# Patient Record
Sex: MALE | Race: WHITE | NOT HISPANIC OR LATINO | Employment: OTHER | URBAN - METROPOLITAN AREA
[De-identification: names, ages, dates, MRNs, and addresses within clinical notes are randomized per-mention and may not be internally consistent; named-entity substitution may affect disease eponyms.]

---

## 2023-08-24 ENCOUNTER — HOSPITAL ENCOUNTER (OUTPATIENT)
Dept: DATA CONVERSION | Facility: HOSPITAL | Age: 83
Discharge: HOME | End: 2023-08-24
Payer: MEDICARE

## 2023-08-24 DIAGNOSIS — R73.01 IMPAIRED FASTING GLUCOSE: ICD-10-CM

## 2023-08-24 LAB
ALBUMIN SERPL-MCNC: 4.4 GM/DL (ref 3.5–5)
ALBUMIN/GLOB SERPL: 2 RATIO (ref 1.5–3)
ALP BLD-CCNC: 65 U/L (ref 35–125)
ALT SERPL-CCNC: 17 U/L (ref 5–40)
ANION GAP SERPL CALCULATED.3IONS-SCNC: 11 MMOL/L (ref 0–19)
AST SERPL-CCNC: 29 U/L (ref 5–40)
BILIRUB SERPL-MCNC: 1.2 MG/DL (ref 0.1–1.2)
BUN SERPL-MCNC: 15 MG/DL (ref 8–25)
BUN/CREAT SERPL: 12.5 RATIO (ref 8–21)
CALCIUM SERPL-MCNC: 9.8 MG/DL (ref 8.5–10.4)
CHLORIDE SERPL-SCNC: 106 MMOL/L (ref 97–107)
CO2 SERPL-SCNC: 25 MMOL/L (ref 24–31)
CREAT SERPL-MCNC: 1.2 MG/DL (ref 0.4–1.6)
DEPRECATED RDW RBC AUTO: 43.6 FL (ref 37–54)
ERYTHROCYTE [DISTWIDTH] IN BLOOD BY AUTOMATED COUNT: 12.7 % (ref 11.7–15)
GFR SERPL CREATININE-BSD FRML MDRD: 61 ML/MIN/1.73 M2
GLOBULIN SER-MCNC: 2.2 G/DL (ref 1.9–3.7)
GLUCOSE SERPL-MCNC: 106 MG/DL (ref 65–99)
HBA1C MFR BLD: 5.9 % (ref 4–6)
HCT VFR BLD AUTO: 46.8 % (ref 41–50)
HGB BLD-MCNC: 16.1 GM/DL (ref 13.5–16.5)
MCH RBC QN AUTO: 32.6 PG (ref 26–34)
MCHC RBC AUTO-ENTMCNC: 34.4 % (ref 31–37)
MCV RBC AUTO: 94.7 FL (ref 80–100)
NRBC BLD-RTO: 0 /100 WBC
PLATELET # BLD AUTO: 208 K/UL (ref 150–450)
PMV BLD AUTO: 10.8 CU (ref 7–12.6)
POTASSIUM SERPL-SCNC: 4.1 MMOL/L (ref 3.4–5.1)
PROT SERPL-MCNC: 6.6 G/DL (ref 5.9–7.9)
RBC # BLD AUTO: 4.94 M/UL (ref 4.5–5.5)
SODIUM SERPL-SCNC: 142 MMOL/L (ref 133–145)
WBC # BLD AUTO: 3.9 K/UL (ref 4.5–11)

## 2023-09-06 PROBLEM — E55.9 VITAMIN D DEFICIENCY: Status: ACTIVE | Noted: 2023-09-06

## 2023-09-06 PROBLEM — K21.9 GASTROESOPHAGEAL REFLUX DISEASE: Status: ACTIVE | Noted: 2023-09-06

## 2023-09-06 PROBLEM — N43.40 SPERMATOCELE: Status: ACTIVE | Noted: 2023-09-06

## 2023-09-06 PROBLEM — N52.1 ERECTILE DYSFUNCTION DUE TO DISEASES CLASSIFIED ELSEWHERE: Status: ACTIVE | Noted: 2023-09-06

## 2023-09-06 PROBLEM — R42 VERTIGO: Status: ACTIVE | Noted: 2023-09-06

## 2023-09-06 PROBLEM — K86.2 PANCREATIC CYST (HHS-HCC): Status: ACTIVE | Noted: 2023-09-06

## 2023-09-06 PROBLEM — I25.2 HISTORY OF MYOCARDIAL INFARCTION: Status: ACTIVE | Noted: 2023-09-06

## 2023-09-06 PROBLEM — I10 ESSENTIAL HYPERTENSION: Status: ACTIVE | Noted: 2023-09-06

## 2023-09-06 PROBLEM — I49.3 PVCS (PREMATURE VENTRICULAR CONTRACTIONS): Status: ACTIVE | Noted: 2023-09-06

## 2023-09-06 PROBLEM — Z95.5 HISTORY OF CORONARY ARTERY STENT PLACEMENT: Status: ACTIVE | Noted: 2023-09-06

## 2023-09-06 PROBLEM — N40.0 BENIGN PROSTATIC HYPERPLASIA: Status: ACTIVE | Noted: 2023-09-06

## 2023-09-06 PROBLEM — M31.30 GRANULOMATOSIS WITH POLYANGIITIS (MULTI): Status: ACTIVE | Noted: 2023-09-06

## 2023-09-06 PROBLEM — M19.90 DEGENERATIVE JOINT DISEASE: Status: ACTIVE | Noted: 2023-09-06

## 2023-09-06 PROBLEM — H91.90 HARD OF HEARING: Status: ACTIVE | Noted: 2023-09-06

## 2023-09-06 PROBLEM — I25.10 ATHEROSCLEROSIS OF NATIVE CORONARY ARTERY OF NATIVE HEART WITHOUT ANGINA PECTORIS: Status: ACTIVE | Noted: 2023-09-06

## 2023-09-06 PROBLEM — E78.00 PURE HYPERCHOLESTEROLEMIA: Status: ACTIVE | Noted: 2023-09-06

## 2023-09-06 PROBLEM — I49.1 PAC (PREMATURE ATRIAL CONTRACTION): Status: ACTIVE | Noted: 2023-09-06

## 2023-09-06 PROBLEM — R73.01 IMPAIRED FASTING GLUCOSE: Status: ACTIVE | Noted: 2023-09-06

## 2023-09-06 PROBLEM — D64.9 ANEMIA: Status: ACTIVE | Noted: 2023-09-06

## 2023-09-06 RX ORDER — EPINEPHRINE 0.3 MG/.3ML
INJECTION INTRAMUSCULAR
COMMUNITY
Start: 2018-12-13 | End: 2023-11-07 | Stop reason: ALTCHOICE

## 2023-09-06 RX ORDER — PREDNISONE 5 MG/1
TABLET ORAL
COMMUNITY
Start: 2022-09-23 | End: 2024-01-16 | Stop reason: ALTCHOICE

## 2023-09-06 RX ORDER — CEFUROXIME AXETIL 250 MG/1
1 TABLET ORAL 2 TIMES DAILY
COMMUNITY
Start: 2018-02-28 | End: 2023-11-07 | Stop reason: ALTCHOICE

## 2023-09-06 RX ORDER — EZETIMIBE 10 MG
TABLET ORAL
COMMUNITY
Start: 2023-02-06 | End: 2023-12-20

## 2023-09-06 RX ORDER — GUAIFENESIN 1200 MG
TABLET, EXTENDED RELEASE 12 HR ORAL
COMMUNITY
End: 2023-11-07 | Stop reason: ALTCHOICE

## 2023-09-06 RX ORDER — ACETAMINOPHEN 500 MG
TABLET ORAL
COMMUNITY

## 2023-09-06 RX ORDER — CHOLECALCIFEROL (VITAMIN D3) 125 MCG
CAPSULE ORAL
COMMUNITY
End: 2023-11-07 | Stop reason: ALTCHOICE

## 2023-09-06 RX ORDER — FUROSEMIDE 20 MG/1
TABLET ORAL
COMMUNITY
End: 2024-01-17 | Stop reason: ALTCHOICE

## 2023-09-06 RX ORDER — POTASSIUM CHLORIDE 20 MEQ/1
TABLET, EXTENDED RELEASE ORAL
COMMUNITY
Start: 2023-06-12 | End: 2023-12-05

## 2023-09-06 RX ORDER — LANOLIN ALCOHOL/MO/W.PET/CERES
1 CREAM (GRAM) TOPICAL DAILY
COMMUNITY
End: 2023-11-07 | Stop reason: ALTCHOICE

## 2023-09-06 RX ORDER — PREDNISONE 2.5 MG/1
TABLET ORAL
COMMUNITY
Start: 2022-11-23 | End: 2023-11-07 | Stop reason: ALTCHOICE

## 2023-09-06 RX ORDER — FOLIC ACID 0.8 MG
TABLET ORAL
COMMUNITY

## 2023-09-06 RX ORDER — POLYETHYLENE GLYCOL 1450
POWDER (GRAM) MISCELLANEOUS
COMMUNITY
End: 2023-11-07 | Stop reason: ALTCHOICE

## 2023-09-06 RX ORDER — SULFAMETHOXAZOLE AND TRIMETHOPRIM 800; 160 MG/1; MG/1
TABLET ORAL
COMMUNITY
Start: 2023-07-24 | End: 2023-10-22

## 2023-09-06 RX ORDER — AMLODIPINE BESYLATE 10 MG/1
TABLET ORAL
COMMUNITY
Start: 2022-09-08 | End: 2023-11-07 | Stop reason: ALTCHOICE

## 2023-09-06 RX ORDER — PYRIDOXINE HCL (VITAMIN B6) 100 MG
1 TABLET ORAL DAILY
COMMUNITY
End: 2023-11-07 | Stop reason: ALTCHOICE

## 2023-09-06 RX ORDER — AVACOPAN 10 MG/1
CAPSULE ORAL
COMMUNITY
Start: 2023-04-28 | End: 2024-01-16 | Stop reason: ALTCHOICE

## 2023-09-06 RX ORDER — ALBUTEROL SULFATE 90 UG/1
AEROSOL, METERED RESPIRATORY (INHALATION)
COMMUNITY
Start: 2018-02-28 | End: 2023-11-07 | Stop reason: ALTCHOICE

## 2023-09-06 RX ORDER — VITAMIN E MIXED 400 UNIT
CAPSULE ORAL
COMMUNITY

## 2023-09-06 RX ORDER — OMEPRAZOLE 20 MG/1
TABLET, DELAYED RELEASE ORAL
COMMUNITY

## 2023-09-06 RX ORDER — ROSUVASTATIN CALCIUM 10 MG/1
TABLET, COATED ORAL
COMMUNITY
Start: 2023-02-06 | End: 2023-12-20

## 2023-09-06 RX ORDER — PETROLATUM,WHITE/LANOLIN
OINTMENT (GRAM) TOPICAL
COMMUNITY

## 2023-09-06 RX ORDER — ASPIRIN 81 MG/1
TABLET ORAL
COMMUNITY

## 2023-09-06 RX ORDER — GLUCOSAMINE/CHONDR SU A SOD 750-600 MG
1 TABLET ORAL DAILY
COMMUNITY
End: 2023-11-07 | Stop reason: ALTCHOICE

## 2023-09-06 RX ORDER — HYDROCHLOROTHIAZIDE 12.5 MG/1
12.5 CAPSULE ORAL
COMMUNITY
Start: 2023-06-12 | End: 2023-11-07 | Stop reason: ALTCHOICE

## 2023-09-06 RX ORDER — BENZONATATE 200 MG/1
CAPSULE ORAL
COMMUNITY
Start: 2018-02-28 | End: 2023-11-07 | Stop reason: ALTCHOICE

## 2023-10-30 ENCOUNTER — TELEPHONE (OUTPATIENT)
Dept: PRIMARY CARE | Facility: CLINIC | Age: 83
End: 2023-10-30
Payer: MEDICARE

## 2023-10-30 NOTE — TELEPHONE ENCOUNTER
Spouse called to update that patient tested positive for Covid after going to the ER on Saturday 10/28. Started on Paxlovid, doing ok. Appointment for 10/31 rescheduled to 11/7.

## 2023-10-31 ENCOUNTER — APPOINTMENT (OUTPATIENT)
Dept: PRIMARY CARE | Facility: CLINIC | Age: 83
End: 2023-10-31
Payer: MEDICARE

## 2023-11-07 ENCOUNTER — TELEPHONE (OUTPATIENT)
Dept: PRIMARY CARE | Facility: CLINIC | Age: 83
End: 2023-11-07

## 2023-11-07 ENCOUNTER — OFFICE VISIT (OUTPATIENT)
Dept: PRIMARY CARE | Facility: CLINIC | Age: 83
End: 2023-11-07
Payer: MEDICARE

## 2023-11-07 VITALS
TEMPERATURE: 97.9 F | HEART RATE: 53 BPM | SYSTOLIC BLOOD PRESSURE: 130 MMHG | BODY MASS INDEX: 30.61 KG/M2 | DIASTOLIC BLOOD PRESSURE: 70 MMHG | WEIGHT: 195 LBS | OXYGEN SATURATION: 95 % | HEIGHT: 67 IN

## 2023-11-07 DIAGNOSIS — N62 GYNECOMASTIA: Primary | ICD-10-CM

## 2023-11-07 DIAGNOSIS — K21.9 GASTROESOPHAGEAL REFLUX DISEASE WITHOUT ESOPHAGITIS: ICD-10-CM

## 2023-11-07 DIAGNOSIS — M31.30 GRANULOMATOSIS WITH POLYANGIITIS WITHOUT RENAL INVOLVEMENT (MULTI): ICD-10-CM

## 2023-11-07 DIAGNOSIS — E55.9 VITAMIN D DEFICIENCY: ICD-10-CM

## 2023-11-07 DIAGNOSIS — I25.10 ATHEROSCLEROSIS OF NATIVE CORONARY ARTERY OF NATIVE HEART WITHOUT ANGINA PECTORIS: ICD-10-CM

## 2023-11-07 DIAGNOSIS — I49.3 PVCS (PREMATURE VENTRICULAR CONTRACTIONS): ICD-10-CM

## 2023-11-07 DIAGNOSIS — R73.01 IMPAIRED FASTING GLUCOSE: ICD-10-CM

## 2023-11-07 DIAGNOSIS — R92.8 ABNORMAL MAMMOGRAM OF RIGHT BREAST: ICD-10-CM

## 2023-11-07 PROBLEM — Z87.19 HISTORY OF PANCREATITIS: Status: ACTIVE | Noted: 2023-11-07

## 2023-11-07 PROBLEM — Z87.19 HISTORY OF PANCREATITIS: Status: RESOLVED | Noted: 2023-11-07 | Resolved: 2023-11-07

## 2023-11-07 PROBLEM — E66.3 OVERWEIGHT WITH BODY MASS INDEX (BMI) 25.0-29.9: Status: ACTIVE | Noted: 2023-11-07

## 2023-11-07 PROBLEM — D64.9 ANEMIA: Status: RESOLVED | Noted: 2023-09-06 | Resolved: 2023-11-07

## 2023-11-07 PROCEDURE — 99213 OFFICE O/P EST LOW 20 MIN: CPT | Performed by: INTERNAL MEDICINE

## 2023-11-07 PROCEDURE — 1125F AMNT PAIN NOTED PAIN PRSNT: CPT | Performed by: INTERNAL MEDICINE

## 2023-11-07 PROCEDURE — 3075F SYST BP GE 130 - 139MM HG: CPT | Performed by: INTERNAL MEDICINE

## 2023-11-07 PROCEDURE — 1036F TOBACCO NON-USER: CPT | Performed by: INTERNAL MEDICINE

## 2023-11-07 PROCEDURE — 3078F DIAST BP <80 MM HG: CPT | Performed by: INTERNAL MEDICINE

## 2023-11-07 PROCEDURE — 1159F MED LIST DOCD IN RCRD: CPT | Performed by: INTERNAL MEDICINE

## 2023-11-07 RX ORDER — SULFAMETHOXAZOLE AND TRIMETHOPRIM 200; 40 MG/5ML; MG/5ML
SUSPENSION ORAL DAILY
COMMUNITY
End: 2024-04-16 | Stop reason: WASHOUT

## 2023-11-07 ASSESSMENT — ENCOUNTER SYMPTOMS
PALPITATIONS: 0
DEPRESSION: 0
LOSS OF SENSATION IN FEET: 0
OCCASIONAL FEELINGS OF UNSTEADINESS: 0
SHORTNESS OF BREATH: 0

## 2023-11-07 ASSESSMENT — PATIENT HEALTH QUESTIONNAIRE - PHQ9
2. FEELING DOWN, DEPRESSED OR HOPELESS: NOT AT ALL
SUM OF ALL RESPONSES TO PHQ9 QUESTIONS 1 AND 2: 0
1. LITTLE INTEREST OR PLEASURE IN DOING THINGS: NOT AT ALL

## 2023-11-07 ASSESSMENT — PAIN SCALES - GENERAL: PAINLEVEL: 4

## 2023-11-07 NOTE — TELEPHONE ENCOUNTER
Patient called stating that he could not schedule his mammogram. States he was told by scheduling that due to this being the first time he is having this done the order has to include both sides, not just the left side. Order needs to be updated before the patient can schedule.

## 2023-11-07 NOTE — TELEPHONE ENCOUNTER
Patient made aware that new order was placed and to call and schedule, stated thanks and understanding

## 2023-11-07 NOTE — PATIENT INSTRUCTIONS
Diagnoses and all orders for this visit:  Gynecomastia  Comments:  Mild inflammation of left breast, Mild gynecoamstia. Will check mammogram. [phone] to schedule. Doubt medication causing it. Should resolve over time.  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

## 2023-11-07 NOTE — PROGRESS NOTES
[facility]: [facility]  PROGRESS NOTE      Boyd Johns is a 83 y.o. male that is presenting today for left breast pain and tenderness.    Assessment/Plan   Diagnoses and all orders for this visit:  Gynecomastia  Comments:  Mild inflammation of left breast, Mild gynecoamstia. Will check mammogram. [phone] to schedule. Doubt medication causinng it. Should resolve over time.  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 2 moths left breaast tenderness. Meds reviwed.      Review of Systems   Respiratory:  Negative for shortness of breath.    Cardiovascular:  Negative for chest pain and palpitations.   All other systems reviewed and are negative.     Objective   Vitals:    11/07/23 1010   BP: 130/70   Pulse: 53   Temp: 36.6 °C (97.9 °F)   SpO2: 95%      Body mass index is 30.54 kg/m².  Physical Exam  Constitutional:       General: He is not in acute distress.  HENT:      Head: Normocephalic and atraumatic.      Right Ear: Tympanic membrane normal.      Left Ear: Tympanic membrane normal.      Mouth/Throat:      Mouth: Mucous membranes are moist.      Pharynx: Oropharynx is clear.   Eyes:      Extraocular Movements: Extraocular movements intact.      Conjunctiva/sclera: Conjunctivae normal.      Pupils: Pupils are equal, round, and reactive to light.   Cardiovascular:      Rate and Rhythm: Normal rate and regular rhythm.   Pulmonary:      Breath sounds: Normal breath sounds.   Abdominal:      General: Bowel sounds are normal.      Palpations: Abdomen is soft. There is no mass.   Musculoskeletal:         General: Normal  "range of motion.      Cervical back: Neck supple. No tenderness.   Skin:     General: Skin is warm and dry.      Comments: Left breast mild gynecomastia no discharge of nipple, no axillary nodes.   Neurological:      General: No focal deficit present.      Mental Status: He is oriented to person, place, and time.       Diagnostic Results   Lab Results   Component Value Date    GLUCOSE 106 (H) 08/24/2023    CALCIUM 9.8 08/24/2023     08/24/2023    K 4.1 08/24/2023    CO2 25 08/24/2023     08/24/2023    BUN 15 08/24/2023    CREATININE 1.2 08/24/2023     Lab Results   Component Value Date    ALT 17 08/24/2023    AST 29 08/24/2023    ALKPHOS 65 08/24/2023    BILITOT 1.2 08/24/2023     Lab Results   Component Value Date    WBC 3.9 (L) 08/24/2023    HGB 16.1 08/24/2023    HCT 46.8 08/24/2023    MCV 94.7 08/24/2023     08/24/2023     Lab Results   Component Value Date    CHOL 139 04/26/2023    CHOL 141 01/19/2023    CHOL 173 06/27/2022     Lab Results   Component Value Date    HDL 59 04/26/2023    HDL 49 01/19/2023    HDL 62 06/27/2022     Lab Results   Component Value Date    LDLCALC 62 (L) 04/26/2023    LDLCALC 74 01/19/2023    LDLCALC 94 06/27/2022     Lab Results   Component Value Date    TRIG 88 04/26/2023    TRIG 89 01/19/2023    TRIG 86 06/27/2022     No components found for: \"CHOLHDL\"  Lab Results   Component Value Date    HGBA1C 5.9 08/24/2023     Other labs not included in the list above were reviewed either before or during this encounter.    History    History reviewed. No pertinent past medical history.  History reviewed. No pertinent surgical history.  Family History   Problem Relation Name Age of Onset    Pancreatic cancer Mother      Alzheimer's disease Father      Mental illness Father      Cancer Daughter      Heart disease Sibling       Social History     Socioeconomic History    Marital status:      Spouse name: Not on file    Number of children: Not on file    Years of " education: Not on file    Highest education level: Not on file   Occupational History    Not on file   Tobacco Use    Smoking status: Never     Passive exposure: Never    Smokeless tobacco: Never   Vaping Use    Vaping Use: Never used   Substance and Sexual Activity    Alcohol use: Yes    Drug use: Never    Sexual activity: Not on file   Other Topics Concern    Not on file   Social History Narrative    Not on file     Social Determinants of Health     Financial Resource Strain: Not on file   Food Insecurity: Not on file   Transportation Needs: Not on file   Physical Activity: Not on file   Stress: Not on file   Social Connections: Not on file   Intimate Partner Violence: Not on file   Housing Stability: Not on file     No Known Allergies  Current Outpatient Medications on File Prior to Visit   Medication Sig Dispense Refill    ascorbic acid, vitamin C, 1,000 mg ER tablet Take 1 tablet (1,000 mg) by mouth once daily.      aspirin 81 mg EC tablet 1 tablet Orally Once a day      cholecalciferol (Vitamin D-3) 1,250 mcg (50,000 unit) capsule 1 capsule Orally Once a day      folic acid (Folvite) 800 mcg tablet 1/2 tab Orally Once a day      furosemide (Lasix) 20 mg tablet Take 1 tablet by mouth every Monday,Wednesday,Friday.      glucosamine sulfate 500 mg capsule 1 caps Orally Once a day      omeprazole OTC (PriLOSEC OTC) 20 mg EC tablet 1/2 tablet by mouth daily      potassium chloride CR 20 mEq ER tablet Take 1 tablet by mouth every Monday,Wednesday,Friday.      predniSONE (Deltasone) 5 mg tablet Take 2.5 tablets by mouth once daily for 7 days, 2 tablets once daily for 7 days, 1.5 tablets once daily for 7 days, THEN 1 tablet once daily.      rosuvastatin (Crestor) 10 mg tablet 1 tablet Orally Once a day      sulfamethoxazole-trimethoprim (Bactrim) 200-40 mg/5 mL suspension Take by mouth once daily. M-W-F      Tavneos 10 mg capsule 6 days a week      VITAMIN B COMPLEX ORAL 1000mcg Orally Daily      vitamin E 180 mg (400  unit) capsule 1 capsule Orally Once a day      Zetia 10 mg tablet 1 tablet Orally Once a day      ZINC ORAL Zinc 500 mg - 1 tablet Orally once a day      [DISCONTINUED] acetaminophen (Tylenol) 325 mg capsule 1 capsule as needed Orally every 6 hrs      [DISCONTINUED] albuterol (ProAir HFA) 90 mcg/actuation inhaler INHALE 2 PUFFS EVERY 4-6 HOURS AS NEEDED.      [DISCONTINUED] amLODIPine (Norvasc) 10 mg tablet TAKE 1 TABLET BY MOUTH EVERY DAY      [DISCONTINUED] atorvastatin calcium (ATORVASTATIN ORAL) Take 30 mg by mouth every other day.      [DISCONTINUED] benzonatate (Tessalon) 200 mg capsule TAKE 1 CAPSULE 3 TIMES DAILY AS NEEDED.      [DISCONTINUED] cefuroxime (Ceftin) 250 mg tablet Take 1 tablet (250 mg) by mouth 2 times a day.      [DISCONTINUED] cholecalciferol (Vitamin D-3) 125 MCG (5000 UT) capsule 1 cap(s) orally once a day      [DISCONTINUED] clotrimazole 1 % lotion 1 application Externally Twice a day      [DISCONTINUED] cyanocobalamin (Vitamin B-12) 1,000 mcg tablet Take 1 tablet (1,000 mcg) by mouth once daily.      [DISCONTINUED] EPINEPHrine (EpiPen 2-Dez) 0.3 mg/0.3 mL injection syringe as directed Injection Daily prn swollen lipis/tongue difficulty breathing      [DISCONTINUED] glucosamine HCl 1,500 mg tablet Take 1 tablet by mouth once daily.      [DISCONTINUED] hydroCHLOROthiazide (Microzide) 12.5 mg capsule Take 1 capsule (12.5 mg) by mouth once daily.      [DISCONTINUED] MAGNESIUM ORAL 400 milligram(s) orally once a day      [DISCONTINUED] polyethylene glycol 1450,bulk, powder as directed      [DISCONTINUED] predniSONE (Deltasone) 2.5 mg tablet Take 1 tablet by mouth once daily for 28 days.      [DISCONTINUED] pyridoxine (Vitamin B-6) 100 mg tablet Take 1 tablet (100 mg) by mouth once daily.      [DISCONTINUED] riTUXimab (Rituxan) 10 mg/mL injection as directed Intravenous as directed       No current facility-administered medications on file prior to visit.     Immunization History   Administered  Date(s) Administered    DTaP vaccine, pediatric  (INFANRIX) 04/18/2012    Flu vaccine, quadrivalent, high-dose, preservative free, age 65y+ (FLUZONE) 11/19/2021, 10/10/2022    Influenza, High Dose Seasonal, Preservative Free 12/13/2016, 12/13/2018, 12/04/2019    Influenza, Seasonal, Quadrivalent, Adjuvanted 10/02/2020    Influenza, seasonal, injectable 11/03/2010, 10/11/2012, 12/16/2015    Moderna SARS-CoV-2 Vaccination 02/05/2021, 03/05/2021, 11/19/2021    Pfizer COVID-19 vaccine, bivalent, age 12 years and older (30 mcg/0.3 mL) 11/26/2022    Pfizer Gray Cap SARS-CoV-2 06/01/2022    Pneumococcal conjugate vaccine, 13-valent (PREVNAR 13) 12/16/2015    Pneumococcal conjugate vaccine, 20-valent (PREVNAR 20) 05/04/2023    Pneumococcal polysaccharide vaccine, 23-valent, age 2 years and older (PNEUMOVAX 23) 04/01/2007, 12/13/2016    Tdap vaccine, age 7 year and older (BOOSTRIX) 05/31/2022    Zoster vaccine, recombinant, adult (SHINGRIX) 05/31/2022, 10/26/2022    Zoster, live 03/31/2016     Patient's medical history was reviewed and updated either before or during this encounter.       Julio Bryant MD

## 2023-11-16 ENCOUNTER — HOSPITAL ENCOUNTER (OUTPATIENT)
Dept: RADIOLOGY | Facility: HOSPITAL | Age: 83
Discharge: HOME | End: 2023-11-16
Payer: MEDICARE

## 2023-11-16 VITALS — BODY MASS INDEX: 29.51 KG/M2 | HEIGHT: 67 IN | WEIGHT: 188 LBS

## 2023-11-16 DIAGNOSIS — N62 HYPERTROPHY OF BREAST: ICD-10-CM

## 2023-11-16 DIAGNOSIS — R92.8 OTHER ABNORMAL AND INCONCLUSIVE FINDINGS ON DIAGNOSTIC IMAGING OF BREAST: ICD-10-CM

## 2023-11-16 PROCEDURE — 77066 DX MAMMO INCL CAD BI: CPT

## 2023-12-19 DIAGNOSIS — E78.00 PURE HYPERCHOLESTEROLEMIA: Primary | ICD-10-CM

## 2023-12-20 RX ORDER — EZETIMIBE 10 MG/1
10 TABLET ORAL DAILY
Qty: 90 TABLET | Refills: 3 | Status: SHIPPED | OUTPATIENT
Start: 2023-12-20

## 2023-12-20 RX ORDER — ROSUVASTATIN CALCIUM 10 MG/1
10 TABLET, COATED ORAL DAILY
Qty: 90 TABLET | Refills: 3 | Status: SHIPPED | OUTPATIENT
Start: 2023-12-20

## 2024-01-11 ENCOUNTER — APPOINTMENT (OUTPATIENT)
Dept: CARDIOLOGY | Facility: CLINIC | Age: 84
End: 2024-01-11
Payer: MEDICARE

## 2024-01-16 ENCOUNTER — LAB (OUTPATIENT)
Dept: LAB | Facility: LAB | Age: 84
End: 2024-01-16
Payer: MEDICARE

## 2024-01-16 DIAGNOSIS — N40.3 NODULAR PROSTATE WITH LOWER URINARY TRACT SYMPTOMS: Primary | ICD-10-CM

## 2024-01-16 LAB — PSA SERPL-MCNC: 1.68 NG/ML

## 2024-01-16 PROCEDURE — 36415 COLL VENOUS BLD VENIPUNCTURE: CPT

## 2024-01-16 PROCEDURE — 84153 ASSAY OF PSA TOTAL: CPT

## 2024-01-17 ENCOUNTER — LAB (OUTPATIENT)
Dept: LAB | Facility: LAB | Age: 84
End: 2024-01-17
Payer: MEDICARE

## 2024-01-17 ENCOUNTER — OFFICE VISIT (OUTPATIENT)
Dept: PRIMARY CARE | Facility: CLINIC | Age: 84
End: 2024-01-17
Payer: MEDICARE

## 2024-01-17 VITALS
WEIGHT: 185 LBS | HEIGHT: 67 IN | BODY MASS INDEX: 29.03 KG/M2 | SYSTOLIC BLOOD PRESSURE: 136 MMHG | DIASTOLIC BLOOD PRESSURE: 76 MMHG | HEART RATE: 59 BPM | TEMPERATURE: 97.9 F | OXYGEN SATURATION: 97 %

## 2024-01-17 DIAGNOSIS — K59.01 SLOW TRANSIT CONSTIPATION: ICD-10-CM

## 2024-01-17 DIAGNOSIS — R10.12 LEFT UPPER QUADRANT PAIN: Primary | ICD-10-CM

## 2024-01-17 DIAGNOSIS — E03.9 ACQUIRED HYPOTHYROIDISM: ICD-10-CM

## 2024-01-17 LAB
ALBUMIN SERPL-MCNC: 4.6 G/DL (ref 3.5–5)
ALP BLD-CCNC: 59 U/L (ref 35–125)
ALT SERPL-CCNC: 15 U/L (ref 5–40)
ANION GAP SERPL CALC-SCNC: 9 MMOL/L
AST SERPL-CCNC: 31 U/L (ref 5–40)
BILIRUB SERPL-MCNC: 0.9 MG/DL (ref 0.1–1.2)
BUN SERPL-MCNC: 13 MG/DL (ref 8–25)
CALCIUM SERPL-MCNC: 9.9 MG/DL (ref 8.5–10.4)
CHLORIDE SERPL-SCNC: 100 MMOL/L (ref 97–107)
CO2 SERPL-SCNC: 32 MMOL/L (ref 24–31)
CREAT SERPL-MCNC: 1.2 MG/DL (ref 0.4–1.6)
EGFRCR SERPLBLD CKD-EPI 2021: 60 ML/MIN/1.73M*2
GLUCOSE SERPL-MCNC: 110 MG/DL (ref 65–99)
POTASSIUM SERPL-SCNC: 4.5 MMOL/L (ref 3.4–5.1)
PROT SERPL-MCNC: 6.3 G/DL (ref 5.9–7.9)
SODIUM SERPL-SCNC: 141 MMOL/L (ref 133–145)
TSH SERPL DL<=0.05 MIU/L-ACNC: 2.25 MIU/L (ref 0.27–4.2)

## 2024-01-17 PROCEDURE — 1125F AMNT PAIN NOTED PAIN PRSNT: CPT | Performed by: INTERNAL MEDICINE

## 2024-01-17 PROCEDURE — 1159F MED LIST DOCD IN RCRD: CPT | Performed by: INTERNAL MEDICINE

## 2024-01-17 PROCEDURE — 80053 COMPREHEN METABOLIC PANEL: CPT

## 2024-01-17 PROCEDURE — 3078F DIAST BP <80 MM HG: CPT | Performed by: INTERNAL MEDICINE

## 2024-01-17 PROCEDURE — 84443 ASSAY THYROID STIM HORMONE: CPT

## 2024-01-17 PROCEDURE — 36415 COLL VENOUS BLD VENIPUNCTURE: CPT

## 2024-01-17 PROCEDURE — 99213 OFFICE O/P EST LOW 20 MIN: CPT | Performed by: INTERNAL MEDICINE

## 2024-01-17 PROCEDURE — 1036F TOBACCO NON-USER: CPT | Performed by: INTERNAL MEDICINE

## 2024-01-17 PROCEDURE — 3075F SYST BP GE 130 - 139MM HG: CPT | Performed by: INTERNAL MEDICINE

## 2024-01-17 RX ORDER — HYDROCHLOROTHIAZIDE 12.5 MG/1
12.5 TABLET ORAL DAILY
COMMUNITY
End: 2024-04-15 | Stop reason: WASHOUT

## 2024-01-17 RX ORDER — ACETAMINOPHEN 325 MG/1
TABLET ORAL EVERY 6 HOURS PRN
COMMUNITY

## 2024-01-17 RX ORDER — POLYETHYLENE GLYCOL 3350 17 G/17G
17 POWDER, FOR SOLUTION ORAL DAILY
COMMUNITY

## 2024-01-17 RX ORDER — POTASSIUM CHLORIDE 1.5 G/1.58G
20 POWDER, FOR SOLUTION ORAL DAILY
COMMUNITY
End: 2024-04-15 | Stop reason: WASHOUT

## 2024-01-17 ASSESSMENT — PAIN SCALES - GENERAL: PAINLEVEL: 4

## 2024-01-17 ASSESSMENT — ENCOUNTER SYMPTOMS
ABDOMINAL PAIN: 1
PALPITATIONS: 0
LOSS OF SENSATION IN FEET: 0
OCCASIONAL FEELINGS OF UNSTEADINESS: 0
DEPRESSION: 0
SHORTNESS OF BREATH: 0

## 2024-01-17 NOTE — PROGRESS NOTES
[facility]: [facility]  PROGRESS NOTE      Boyd Johns is a 84 y.o. male that is presenting today for Abdominal Pain (Left side ab pain , constipation x 1 mo).    Assessment/Plan   Diagnoses and all orders for this visit:  Left upper quadrant pain  Comments:  Constipation can induce, check Thyroid, CMP labs, get U/S of spleen make sure OK. Miralx 1 adn 1/2 capful daily with 48 oz water daily plus meal fluids.  Orders:  -     US abdomen limited spleen; Future  Slow transit constipation  Comments:  Stop calcium carbonate.  Water intake as above.  And check thyroid and electrolytes.  Continue MiraLAX and probiotics as directed  Orders:  -     Comprehensive Metabolic Panel; Future  -     TSH with reflex to Free T4 if abnormal; Future  Acquired hypothyroidism    Subjective   Pain on left side, constipation. For 1 months, normal bowel movement. Miralax, probiotics, walking left upper pain no  blood  follow up, no diverticular sx no flank pain.    Abdominal Pain      Review of Systems   Respiratory:  Negative for shortness of breath.    Cardiovascular:  Negative for chest pain and palpitations.   Gastrointestinal:  Positive for abdominal pain.   All other systems reviewed and are negative.     Objective   Vitals:    01/17/24 1048   BP: 136/76   Pulse: 59   Temp: 36.6 °C (97.9 °F)   SpO2: 97%      Body mass index is 28.98 kg/m².  Physical Exam  Constitutional:       General: He is not in acute distress.  HENT:      Head: Normocephalic and atraumatic.      Right Ear: Tympanic membrane normal.      Left Ear: Tympanic membrane normal.      Mouth/Throat:      Mouth: Mucous membranes are moist.      Pharynx: Oropharynx is clear.   Eyes:      Extraocular Movements: Extraocular movements intact.      Conjunctiva/sclera: Conjunctivae normal.      Pupils: Pupils are equal, round, and reactive to light.   Cardiovascular:      Rate and Rhythm: Normal rate and regular rhythm.   Pulmonary:       "Breath sounds: Normal breath sounds.   Abdominal:      General: Bowel sounds are normal. There is no distension.      Palpations: Abdomen is soft. There is no mass.   Musculoskeletal:         General: Tenderness (mild left upper quad tenderness muscular?) present. Normal range of motion.      Cervical back: Neck supple. No tenderness.   Skin:     General: Skin is warm and dry.   Neurological:      General: No focal deficit present.      Mental Status: He is oriented to person, place, and time.       Diagnostic Results   Lab Results   Component Value Date    GLUCOSE 106 (H) 08/24/2023    CALCIUM 9.8 08/24/2023     08/24/2023    K 4.1 08/24/2023    CO2 25 08/24/2023     08/24/2023    BUN 15 08/24/2023    CREATININE 1.2 08/24/2023     Lab Results   Component Value Date    ALT 17 08/24/2023    AST 29 08/24/2023    ALKPHOS 65 08/24/2023    BILITOT 1.2 08/24/2023     Lab Results   Component Value Date    WBC 3.9 (L) 08/24/2023    HGB 16.1 08/24/2023    HCT 46.8 08/24/2023    MCV 94.7 08/24/2023     08/24/2023     Lab Results   Component Value Date    CHOL 139 04/26/2023    CHOL 141 01/19/2023    CHOL 173 06/27/2022     Lab Results   Component Value Date    HDL 59 04/26/2023    HDL 49 01/19/2023    HDL 62 06/27/2022     Lab Results   Component Value Date    LDLCALC 62 (L) 04/26/2023    LDLCALC 74 01/19/2023    LDLCALC 94 06/27/2022     Lab Results   Component Value Date    TRIG 88 04/26/2023    TRIG 89 01/19/2023    TRIG 86 06/27/2022     No components found for: \"CHOLHDL\"  Lab Results   Component Value Date    HGBA1C 5.9 08/24/2023     Other labs not included in the list above were reviewed either before or during this encounter.    History    Past Medical History:   Diagnosis Date    Atherosclerosis of native coronary artery of native heart without angina pectoris 09/06/2023    Degenerative joint disease 09/06/2023    Gastroesophageal reflux disease 09/06/2023    Granulomatosis with polyangiitis " (CMS/[facility]) 09/06/2023     rheumatology [facility]     History of pancreatitis 11/07/2023     work up neg.    Impaired fasting glucose 09/06/2023    PVCs (premature ventricular contractions) 09/06/2023    Vitamin D deficiency 09/06/2023     Past Surgical History:   Procedure Laterality Date    CHOLECYSTECTOMY       Family History   Problem Relation Name Age of Onset    Pancreatic cancer Mother      Alzheimer's disease Father      Mental illness Father      Cancer Daughter      Heart disease Sibling       Social History     Socioeconomic History    Marital status:      Spouse name: Not on file    Number of children: Not on file    Years of education: Not on file    Highest education level: Not on file   Occupational History    Not on file   Tobacco Use    Smoking status: Never     Passive exposure: Never    Smokeless tobacco: Never   Vaping Use    Vaping Use: Never used   Substance and Sexual Activity    Alcohol use: Yes    Drug use: Never    Sexual activity: Not on file   Other Topics Concern    Not on file   Social History Narrative    Not on file     Social Determinants of Health     Financial Resource Strain: Not on file   Food Insecurity: Not on file   Transportation Needs: Not on file   Physical Activity: Not on file   Stress: Not on file   Social Connections: Not on file   Intimate Partner Violence: Not on file   Housing Stability: Not on file     No Known Allergies  Current Outpatient Medications on File Prior to Visit   Medication Sig Dispense Refill    acetaminophen (Tylenol) 325 mg tablet Take by mouth every 6 hours if needed for mild pain (1 - 3).      ascorbic acid, vitamin C, 1,000 mg ER tablet Take 1 tablet (1,000 mg) by mouth once daily.      aspirin 81 mg EC tablet 1 tablet Orally Once a day      calcium carbonate (CALCIUM 500 ORAL) Take by mouth.      cholecalciferol (Vitamin D-3) 1,250 mcg (50,000 unit) capsule 1 capsule Orally Once a day      ezetimibe (Zetia) 10 mg  tablet TAKE 1 TABLET BY MOUTH ONCE  DAILY 90 tablet 3    folic acid (Folvite) 800 mcg tablet 1/2 tab Orally Once a day      glucosamine sulfate 500 mg capsule 1 caps Orally Once a day      hydroCHLOROthiazide (HYDRODiuril) 12.5 mg tablet Take 1 tablet (12.5 mg) by mouth once daily.      omeprazole OTC (PriLOSEC OTC) 20 mg EC tablet 1/2 tablet by mouth daily      polyethylene glycol (Glycolax, Miralax) 17 gram packet Take 17 g by mouth once daily.      potassium chloride (Klor-Con) 20 mEq packet Take 20 mEq by mouth once daily. M-W-F      rosuvastatin (Crestor) 10 mg tablet TAKE 1 TABLET BY MOUTH ONCE  DAILY 90 tablet 3    sulfamethoxazole-trimethoprim (Bactrim) 200-40 mg/5 mL suspension Take by mouth once daily. M-W-F      VITAMIN B COMPLEX ORAL 1000mcg Orally Daily      vitamin E 180 mg (400 unit) capsule 1 capsule Orally Once a day      ZINC ORAL Zinc 500 mg - 1 tablet Orally once a day      [DISCONTINUED] furosemide (Lasix) 20 mg tablet Take 1 tablet by mouth every Monday,Wednesday,Friday.      [DISCONTINUED] predniSONE (Deltasone) 5 mg tablet Take 2.5 tablets by mouth once daily for 7 days, 2 tablets once daily for 7 days, 1.5 tablets once daily for 7 days, THEN 1 tablet once daily.      [DISCONTINUED] Tavneos 10 mg capsule 6 days a week       No current facility-administered medications on file prior to visit.     Immunization History   Administered Date(s) Administered    DTaP vaccine, pediatric  (INFANRIX) 04/18/2012    Flu vaccine, quadrivalent, high-dose, preservative free, age 65y+ (FLUZONE) 11/19/2021, 10/10/2022    Influenza, High Dose Seasonal, Preservative Free 12/13/2016, 12/13/2018, 12/04/2019    Influenza, Seasonal, Quadrivalent, Adjuvanted 10/02/2020    Influenza, seasonal, injectable 11/03/2010, 10/11/2012, 12/16/2015    Moderna SARS-CoV-2 Vaccination 02/05/2021, 03/05/2021, 11/19/2021    Pfizer COVID-19 vaccine, bivalent, age 12 years and older (30 mcg/0.3 mL) 11/26/2022    Pfizer Gray Cap  SARS-CoV-2 06/01/2022    Pneumococcal conjugate vaccine, 13-valent (PREVNAR 13) 12/16/2015    Pneumococcal conjugate vaccine, 20-valent (PREVNAR 20) 05/04/2023    Pneumococcal polysaccharide vaccine, 23-valent, age 2 years and older (PNEUMOVAX 23) 04/01/2007, 12/13/2016    Tdap vaccine, age 7 year and older (BOOSTRIX) 05/31/2022    Zoster vaccine, recombinant, adult (SHINGRIX) 05/31/2022, 10/26/2022    Zoster, live 03/31/2016     Patient's medical history was reviewed and updated either before or during this encounter.       Julio Bryant MD

## 2024-01-17 NOTE — PATIENT INSTRUCTIONS
Diagnoses and all orders for this visit:  Left upper quadrant pain  Comments:  Constipation can induce, check Thyroid, CMP labs, get U/S of spleen make sure OK. Miralx 1 adn 1/2 capful daily with 48 oz water daily plus meal fluids.  Orders:  -     US abdomen limited spleen; Future  Slow transit constipation  Comments:  Stop calcium carbonate.  Water intake as above.  And check thyroid and electrolytes.  Continue MiraLAX and probiotics as directed  Orders:  -     Comprehensive Metabolic Panel; Future  -     TSH with reflex to Free T4 if abnormal; Future  Acquired hypothyroidism

## 2024-01-18 ENCOUNTER — HOSPITAL ENCOUNTER (OUTPATIENT)
Dept: RADIOLOGY | Facility: HOSPITAL | Age: 84
Discharge: HOME | End: 2024-01-18
Payer: MEDICARE

## 2024-01-18 DIAGNOSIS — R10.12 LEFT UPPER QUADRANT PAIN: ICD-10-CM

## 2024-01-18 PROCEDURE — 76705 ECHO EXAM OF ABDOMEN: CPT

## 2024-02-05 ENCOUNTER — OFFICE VISIT (OUTPATIENT)
Dept: CARDIOLOGY | Facility: CLINIC | Age: 84
End: 2024-02-05
Payer: MEDICARE

## 2024-02-05 VITALS — SYSTOLIC BLOOD PRESSURE: 132 MMHG | DIASTOLIC BLOOD PRESSURE: 68 MMHG | HEART RATE: 52 BPM | OXYGEN SATURATION: 98 %

## 2024-02-05 DIAGNOSIS — Z95.5 S/P BARE METAL CORONARY ARTERY STENT: Primary | ICD-10-CM

## 2024-02-05 PROCEDURE — 99213 OFFICE O/P EST LOW 20 MIN: CPT | Performed by: INTERNAL MEDICINE

## 2024-02-05 PROCEDURE — 3078F DIAST BP <80 MM HG: CPT | Performed by: INTERNAL MEDICINE

## 2024-02-05 PROCEDURE — 1036F TOBACCO NON-USER: CPT | Performed by: INTERNAL MEDICINE

## 2024-02-05 PROCEDURE — 1159F MED LIST DOCD IN RCRD: CPT | Performed by: INTERNAL MEDICINE

## 2024-02-05 PROCEDURE — 1126F AMNT PAIN NOTED NONE PRSNT: CPT | Performed by: INTERNAL MEDICINE

## 2024-02-05 PROCEDURE — 3075F SYST BP GE 130 - 139MM HG: CPT | Performed by: INTERNAL MEDICINE

## 2024-02-05 PROCEDURE — 1157F ADVNC CARE PLAN IN RCRD: CPT | Performed by: INTERNAL MEDICINE

## 2024-02-05 ASSESSMENT — PAIN SCALES - GENERAL: PAINLEVEL: 0-NO PAIN

## 2024-02-05 NOTE — ASSESSMENT & PLAN NOTE
Continue on statin therapy follow-up with me in 6 months or sooner for active cardiac issues lipid profiles on an annual basis followed by his primary care physician

## 2024-02-05 NOTE — PATIENT INSTRUCTIONS
History of coronary artery stent placement  Doing well from a cardiac standpoint on statin therapy and low-dose aspirin    Pure hypercholesterolemia  Continue on statin therapy follow-up with me in 6 months or sooner for active cardiac issues lipid profiles on an annual basis followed by his primary care physician

## 2024-02-26 ENCOUNTER — TELEPHONE (OUTPATIENT)
Dept: PRIMARY CARE | Facility: CLINIC | Age: 84
End: 2024-02-26
Payer: MEDICARE

## 2024-02-26 NOTE — TELEPHONE ENCOUNTER
Spoke with Pierre and his wife in regards to the CT scan of the chest that was not in epic done at [facility].  No significant changes in lung nodules no evidence of mediastinal abnormality or vascular abnormality or skeletal abnormality no evidence of lymph node enlargement some atelectasis otherwise unremarkable.  Recommend lidocaine patches 4% on for 12 hours off for 12 hours Tylenol max dose 3000 mg in a 24-hour period 500 mg capsules to 2-3 times a day.  No alcohol with it.  Did recommend following up with Dr. Mcfadden to recheck the MRI of the abdomen/pancreas but I suspect clinically that this is just all musculoskeletal and not related to pancreatic morbidity but will see once MRI is performed.  Not having nausea vomiting weight loss symptoms are episodic. Will drop off CT report reviewed by phone. Follow up  after GI. Rheumatology.

## 2024-03-21 ENCOUNTER — LAB (OUTPATIENT)
Dept: LAB | Facility: LAB | Age: 84
End: 2024-03-21
Payer: MEDICARE

## 2024-03-21 DIAGNOSIS — R73.01 IMPAIRED FASTING GLUCOSE: ICD-10-CM

## 2024-03-21 DIAGNOSIS — M31.30 WEGENER'S GRANULOMATOSIS WITHOUT RENAL INVOLVEMENT (MULTI): Primary | ICD-10-CM

## 2024-03-21 DIAGNOSIS — I25.10 ATHEROSCLEROTIC HEART DISEASE OF NATIVE CORONARY ARTERY WITHOUT ANGINA PECTORIS: ICD-10-CM

## 2024-03-21 LAB
ALBUMIN SERPL BCP-MCNC: 4.3 G/DL (ref 3.4–5)
ALP SERPL-CCNC: 55 U/L (ref 33–136)
ALT SERPL W P-5'-P-CCNC: 16 U/L (ref 10–52)
ANION GAP SERPL CALC-SCNC: 13 MMOL/L (ref 10–20)
AST SERPL W P-5'-P-CCNC: 28 U/L (ref 9–39)
BILIRUB SERPL-MCNC: 1.2 MG/DL (ref 0–1.2)
BUN SERPL-MCNC: 14 MG/DL (ref 6–23)
CALCIUM SERPL-MCNC: 9.8 MG/DL (ref 8.6–10.3)
CHLORIDE SERPL-SCNC: 102 MMOL/L (ref 98–107)
CHOLEST SERPL-MCNC: 151 MG/DL (ref 0–199)
CHOLESTEROL/HDL RATIO: 2.3
CO2 SERPL-SCNC: 29 MMOL/L (ref 21–32)
CREAT SERPL-MCNC: 1.28 MG/DL (ref 0.5–1.3)
EGFRCR SERPLBLD CKD-EPI 2021: 56 ML/MIN/1.73M*2
ERYTHROCYTE [DISTWIDTH] IN BLOOD BY AUTOMATED COUNT: 12.5 % (ref 11.5–14.5)
EST. AVERAGE GLUCOSE BLD GHB EST-MCNC: 103 MG/DL
GLUCOSE SERPL-MCNC: 96 MG/DL (ref 74–99)
HBA1C MFR BLD: 5.2 %
HCT VFR BLD AUTO: 48.2 % (ref 41–52)
HDLC SERPL-MCNC: 64.8 MG/DL
HGB BLD-MCNC: 15.9 G/DL (ref 13.5–17.5)
LDLC SERPL CALC-MCNC: 62 MG/DL
MCH RBC QN AUTO: 32.9 PG (ref 26–34)
MCHC RBC AUTO-ENTMCNC: 33 G/DL (ref 32–36)
MCV RBC AUTO: 100 FL (ref 80–100)
NON HDL CHOLESTEROL: 86 MG/DL (ref 0–149)
NRBC BLD-RTO: 0 /100 WBCS (ref 0–0)
PLATELET # BLD AUTO: 196 X10*3/UL (ref 150–450)
POTASSIUM SERPL-SCNC: 3.9 MMOL/L (ref 3.5–5.3)
PROT SERPL-MCNC: 6.4 G/DL (ref 6.4–8.2)
RBC # BLD AUTO: 4.84 X10*6/UL (ref 4.5–5.9)
SODIUM SERPL-SCNC: 140 MMOL/L (ref 136–145)
TRIGL SERPL-MCNC: 121 MG/DL (ref 0–149)
VLDL: 24 MG/DL (ref 0–40)
WBC # BLD AUTO: 4.7 X10*3/UL (ref 4.4–11.3)

## 2024-03-21 PROCEDURE — 80061 LIPID PANEL: CPT

## 2024-03-21 PROCEDURE — 36415 COLL VENOUS BLD VENIPUNCTURE: CPT

## 2024-03-21 PROCEDURE — 83036 HEMOGLOBIN GLYCOSYLATED A1C: CPT

## 2024-03-21 PROCEDURE — 85027 COMPLETE CBC AUTOMATED: CPT

## 2024-03-21 PROCEDURE — 80053 COMPREHEN METABOLIC PANEL: CPT

## 2024-04-10 ENCOUNTER — APPOINTMENT (OUTPATIENT)
Dept: PRIMARY CARE | Facility: CLINIC | Age: 84
End: 2024-04-10
Payer: MEDICARE

## 2024-04-15 PROBLEM — R10.32 LEFT LOWER QUADRANT ABDOMINAL PAIN: Status: ACTIVE | Noted: 2024-04-15

## 2024-04-15 PROBLEM — E03.9 ACQUIRED HYPOTHYROIDISM: Status: ACTIVE | Noted: 2024-04-15

## 2024-04-15 PROBLEM — K59.01 SLOW TRANSIT CONSTIPATION: Status: ACTIVE | Noted: 2024-01-17

## 2024-04-15 RX ORDER — TADALAFIL 20 MG/1
20 TABLET ORAL DAILY PRN
COMMUNITY
Start: 2024-01-16

## 2024-04-15 RX ORDER — POTASSIUM CHLORIDE 20 MEQ/1
20 TABLET, EXTENDED RELEASE ORAL
COMMUNITY
Start: 2024-02-29

## 2024-04-15 RX ORDER — HYDROCHLOROTHIAZIDE 12.5 MG/1
12.5 CAPSULE ORAL DAILY
COMMUNITY
Start: 2024-03-15

## 2024-04-16 ENCOUNTER — OFFICE VISIT (OUTPATIENT)
Dept: PRIMARY CARE | Facility: CLINIC | Age: 84
End: 2024-04-16
Payer: MEDICARE

## 2024-04-16 VITALS
HEIGHT: 67 IN | BODY MASS INDEX: 29.51 KG/M2 | OXYGEN SATURATION: 95 % | SYSTOLIC BLOOD PRESSURE: 115 MMHG | DIASTOLIC BLOOD PRESSURE: 68 MMHG | HEART RATE: 61 BPM | TEMPERATURE: 97.9 F | WEIGHT: 188 LBS

## 2024-04-16 DIAGNOSIS — N18.31 STAGE 3A CHRONIC KIDNEY DISEASE (MULTI): ICD-10-CM

## 2024-04-16 DIAGNOSIS — Z00.00 ROUTINE GENERAL MEDICAL EXAMINATION AT HEALTH CARE FACILITY: Primary | ICD-10-CM

## 2024-04-16 DIAGNOSIS — G31.84 MCI (MILD COGNITIVE IMPAIRMENT): ICD-10-CM

## 2024-04-16 DIAGNOSIS — M15.9 PRIMARY OSTEOARTHRITIS INVOLVING MULTIPLE JOINTS: ICD-10-CM

## 2024-04-16 DIAGNOSIS — K86.2 CYST OF PANCREAS (HHS-HCC): ICD-10-CM

## 2024-04-16 DIAGNOSIS — E55.9 VITAMIN D DEFICIENCY: ICD-10-CM

## 2024-04-16 DIAGNOSIS — M31.31 GRANULOMATOSIS WITH POLYANGIITIS WITH RENAL INVOLVEMENT (MULTI): ICD-10-CM

## 2024-04-16 DIAGNOSIS — I10 ESSENTIAL HYPERTENSION: ICD-10-CM

## 2024-04-16 DIAGNOSIS — I25.10 ATHEROSCLEROSIS OF NATIVE CORONARY ARTERY OF NATIVE HEART WITHOUT ANGINA PECTORIS: ICD-10-CM

## 2024-04-16 PROCEDURE — 1157F ADVNC CARE PLAN IN RCRD: CPT | Performed by: INTERNAL MEDICINE

## 2024-04-16 PROCEDURE — G0439 PPPS, SUBSEQ VISIT: HCPCS | Performed by: INTERNAL MEDICINE

## 2024-04-16 PROCEDURE — 1125F AMNT PAIN NOTED PAIN PRSNT: CPT | Performed by: INTERNAL MEDICINE

## 2024-04-16 PROCEDURE — 1036F TOBACCO NON-USER: CPT | Performed by: INTERNAL MEDICINE

## 2024-04-16 PROCEDURE — 3078F DIAST BP <80 MM HG: CPT | Performed by: INTERNAL MEDICINE

## 2024-04-16 PROCEDURE — 99215 OFFICE O/P EST HI 40 MIN: CPT | Performed by: INTERNAL MEDICINE

## 2024-04-16 PROCEDURE — 3074F SYST BP LT 130 MM HG: CPT | Performed by: INTERNAL MEDICINE

## 2024-04-16 PROCEDURE — 1159F MED LIST DOCD IN RCRD: CPT | Performed by: INTERNAL MEDICINE

## 2024-04-16 RX ORDER — SULFAMETHOXAZOLE AND TRIMETHOPRIM 800; 160 MG/1; MG/1
1 TABLET ORAL
COMMUNITY
Start: 2024-03-22

## 2024-04-16 ASSESSMENT — ENCOUNTER SYMPTOMS
WEAKNESS: 0
ABDOMINAL PAIN: 0
SHORTNESS OF BREATH: 0
PALPITATIONS: 0
DYSURIA: 0
FREQUENCY: 0
BRUISES/BLEEDS EASILY: 0
COUGH: 0
CHILLS: 0
DIARRHEA: 0
PHOTOPHOBIA: 0
ARTHRALGIAS: 0
HEADACHES: 0
TREMORS: 0
FEVER: 0
COLOR CHANGE: 0

## 2024-04-16 ASSESSMENT — PATIENT HEALTH QUESTIONNAIRE - PHQ9
1. LITTLE INTEREST OR PLEASURE IN DOING THINGS: NOT AT ALL
SUM OF ALL RESPONSES TO PHQ9 QUESTIONS 1 AND 2: 0
2. FEELING DOWN, DEPRESSED OR HOPELESS: NOT AT ALL
1. LITTLE INTEREST OR PLEASURE IN DOING THINGS: NOT AT ALL
SUM OF ALL RESPONSES TO PHQ9 QUESTIONS 1 AND 2: 0
2. FEELING DOWN, DEPRESSED OR HOPELESS: NOT AT ALL

## 2024-04-16 ASSESSMENT — PAIN SCALES - GENERAL: PAINLEVEL: 2

## 2024-04-16 NOTE — PATIENT INSTRUCTIONS
[facility] to see for PCP, would set up for October Rt 90/91 Heather.    Diagnoses and all orders for this visit:  Routine general medical examination at health care facility  Stage 3a chronic kidney disease (Multi)  Comments:  Hydrate well.  Atherosclerosis of native coronary artery of native heart without angina pectoris  Essential hypertension  Comments:  BP doing well.  Vitamin D deficiency  Cyst of pancreas ([facility]CC)  Comments:  CT chest, abd/pelvis 2/24 [place] no MRCP for 2 years.  Granulomatosis with polyangiitis with renal involvement (Multi)  Primary osteoarthritis involving multiple joints  MCI (mild cognitive impairment)  Comments:  MMSE 29/30 doing OK memory wise.

## 2024-04-16 NOTE — PROGRESS NOTES
[facility]: [facility]  MEDICARE WELLNESS EXAM      Boyd Johns is a 84 y.o. male that is presenting today for Annual Exam.    Assessment/Plan    Diagnoses and all orders for this visit:  Routine general medical examination at health care facility  Stage 3a chronic kidney disease (Multi)  Comments:  Hydrate well.  Atherosclerosis of native coronary artery of native heart without angina pectoris  Essential hypertension  Comments:  BP doing well.  Vitamin D deficiency  Cyst of pancreas (HHS-HCC)  Comments:  CT chest, abd/pelvis 2/24 [facility] no MRCP for 2 years. Might convince to do in 1 year.  Granulomatosis with polyangiitis with renal involvement (Multi)  Primary osteoarthritis involving multiple joints  MCI (mild cognitive impairment)  Comments:  MMSE 29/30 doing OK memory wise.    ADVANCED CARE PLANNING  Advanced Care Planning was discussed with patient:  The patient has an active advanced care plan on file. The patient has an active surrogate decision-maker on file.  Encouraged the patient to confirm that Living Will and Healthcare Power of  (HCPoA) are accurate and up to date.  Encouraged the patient to confirm that our office be provided a copy of any documentation in the event that anything changes.    ACTIVITIES OF DAILY LIVING  Basic ADLs:  Bathing: Independent, Dressing: Independent, Toileting: Independent, Transferring: Independent, Continence: Independent, Feeding: Independent.    Instrumental ADLs:  Ability to use phone: Independent, Shopping: Independent, Cooking: Independent, House-keeping: Independent, Laundry: Independent, Transportation: Independent, Medication Management: Independent, Finance Management: Independent.    Subjective   Wellness visit. Over all health status doing well. Diet reviewed, Mediterranean, low sugar diet suggested. No  active depression, or little interest in doing activites or hopelessness. Home safety reviewed, well light, no throw  rugs,etc. No falls. Advanced directives filled out at home.  ADL, and instrumental ADL no limits doing well. Vision screen eye exam yearly, hearing screen 6 ft whisper test normal.  No cognitive decline observed. No opiod pain meds used.  GI IPMN? [facility] doing well. [facility] will need to see PCP there I think as well.      Review of Systems   Constitutional:  Negative for chills and fever.   HENT:  Negative for congestion.    Eyes:  Negative for photophobia and visual disturbance.   Respiratory:  Negative for cough and shortness of breath.    Cardiovascular:  Negative for chest pain and palpitations.   Gastrointestinal:  Negative for abdominal pain and diarrhea.   Endocrine: Negative for cold intolerance and heat intolerance.   Genitourinary:  Negative for dysuria and frequency.   Musculoskeletal:  Negative for arthralgias.   Skin:  Negative for color change.   Neurological:  Negative for tremors, weakness and headaches.   Hematological:  Does not bruise/bleed easily.     Objective   Vitals:    04/16/24 1525   BP: 115/68   Pulse: 61   Temp: 36.6 °C (97.9 °F)   SpO2: 95%      Body mass index is 29.44 kg/m².  Physical Exam  Constitutional:       General: He is not in acute distress.     Appearance: He is obese. He is not toxic-appearing.   HENT:      Head: Normocephalic and atraumatic.      Right Ear: Tympanic membrane and ear canal normal.      Left Ear: Tympanic membrane and ear canal normal.      Nose: Nose normal.      Mouth/Throat:      Pharynx: Oropharynx is clear.   Eyes:      Extraocular Movements: Extraocular movements intact.      Pupils: Pupils are equal, round, and reactive to light.   Cardiovascular:      Rate and Rhythm: Normal rate and regular rhythm.      Heart sounds: Normal heart sounds. No murmur heard.  Pulmonary:      Breath sounds: Normal breath sounds.   Abdominal:      General: Bowel sounds are normal. There is no distension.      Palpations: Abdomen is soft. There is no mass.       "Tenderness: There is no abdominal tenderness.   Musculoskeletal:         General: No swelling or tenderness.      Right lower leg: No edema.      Left lower leg: No edema.   Skin:     General: Skin is warm and dry.   Neurological:      General: No focal deficit present.      Mental Status: He is oriented to person, place, and time.      Sensory: No sensory deficit.      Motor: No weakness.      Deep Tendon Reflexes: Reflexes normal.       Diagnostic Results   Lab Results   Component Value Date    GLUCOSE 96 03/21/2024    CALCIUM 9.8 03/21/2024     03/21/2024    K 3.9 03/21/2024    CO2 29 03/21/2024     03/21/2024    BUN 14 03/21/2024    CREATININE 1.28 03/21/2024     Lab Results   Component Value Date    ALT 16 03/21/2024    AST 28 03/21/2024    ALKPHOS 55 03/21/2024    BILITOT 1.2 03/21/2024     Lab Results   Component Value Date    WBC 4.7 03/21/2024    HGB 15.9 03/21/2024    HCT 48.2 03/21/2024     03/21/2024     03/21/2024     Lab Results   Component Value Date    CHOL 151 03/21/2024    CHOL 139 04/26/2023    CHOL 141 01/19/2023     Lab Results   Component Value Date    HDL 64.8 03/21/2024    HDL 59 04/26/2023    HDL 49 01/19/2023     Lab Results   Component Value Date    LDLCALC 62 03/21/2024    LDLCALC 62 (L) 04/26/2023    LDLCALC 74 01/19/2023     Lab Results   Component Value Date    TRIG 121 03/21/2024    TRIG 88 04/26/2023    TRIG 89 01/19/2023     No components found for: \"CHOLHDL\"  Lab Results   Component Value Date    HGBA1C 5.2 03/21/2024     Other labs not included in the list above reviewed either before or during this encounter.    History   Past Medical History:   Diagnosis Date    Atherosclerosis of native coronary artery of native heart without angina pectoris 09/06/2023    Benign prostatic hyperplasia 09/06/2023 1/24 PSA 1.68    Constipation      colonoscopy OK last 4 years, TSH nl 1/24, Miralax.    Degenerative joint disease 09/06/2023    Gastroesophageal " reflux disease 09/06/2023    Granulomatosis with polyangiitis (Multi) 09/06/2023     rheumatology [facility]     History of pancreatitis 11/07/2023     work up neg.    Impaired fasting glucose 09/06/2023    PVCs (premature ventricular contractions) 09/06/2023    Vitamin D deficiency 09/06/2023     Past Surgical History:   Procedure Laterality Date    CHOLECYSTECTOMY       Family History   Problem Relation Name Age of Onset    Pancreatic cancer Mother      Alzheimer's disease Father      Mental illness Father      Cancer Daughter      Heart disease Sibling       Social History     Socioeconomic History    Marital status:      Spouse name: Not on file    Number of children: Not on file    Years of education: Not on file    Highest education level: Not on file   Occupational History    Not on file   Tobacco Use    Smoking status: Never     Passive exposure: Never    Smokeless tobacco: Never   Vaping Use    Vaping status: Never Used   Substance and Sexual Activity    Alcohol use: Yes    Drug use: Never    Sexual activity: Not on file   Other Topics Concern    Not on file   Social History Narrative    Not on file     Social Determinants of Health     Financial Resource Strain: Not on file   Food Insecurity: No Food Insecurity (5/24/2022)    [facility] from [facility]    Hunger Vital Sign     Worried About Running Out of Food in the Last Year: Never true     Ran Out of Food in the Last Year: Never true   Transportation Needs: Not on file   Physical Activity: Not on file   Stress: Not on file   Social Connections: Not on file   Intimate Partner Violence: Not on file   Housing Stability: Not on file     No Known Allergies  Current Outpatient Medications on File Prior to Visit   Medication Sig Dispense Refill    acetaminophen (Tylenol) 325 mg tablet Take by mouth every 6 hours if needed for mild pain (1 - 3).      ascorbic acid, vitamin C, 1,000 mg ER tablet Take 1 tablet (1,000 mg) by mouth  once daily.      aspirin 81 mg EC tablet 1 tablet Orally Once a day      cholecalciferol (Vitamin D-3) 5,000 Units tablet       ezetimibe (Zetia) 10 mg tablet TAKE 1 TABLET BY MOUTH ONCE  DAILY 90 tablet 3    folic acid (Folvite) 800 mcg tablet 1/2 tab Orally Once a day      glucosamine sulfate 500 mg capsule 1 caps Orally Once a day      hydroCHLOROthiazide (Microzide) 12.5 mg capsule Take 1 capsule (12.5 mg) by mouth once daily.      iron/calcium/vitamin D2 (CALCIUM 600 IRON/D ORAL) Take by mouth.      omeprazole OTC (PriLOSEC OTC) 20 mg EC tablet 1/2 tablet by mouth daily      polyethylene glycol (Glycolax, Miralax) 17 gram packet Take 17 g by mouth once daily.      potassium chloride CR 20 mEq ER tablet Take 1 tablet (20 mEq) by mouth once a day on Monday, Wednesday, and Friday.      rosuvastatin (Crestor) 10 mg tablet TAKE 1 TABLET BY MOUTH ONCE  DAILY 90 tablet 3    sulfamethoxazole-trimethoprim (Bactrim DS) 800-160 mg tablet Take 1 tablet by mouth once a day on Monday, Wednesday, and Friday.      tadalafil 20 mg tablet Take 1 tablet (20 mg) by mouth once daily as needed.      VITAMIN B COMPLEX ORAL 1000mcg Orally Daily      vitamin E 180 mg (400 unit) capsule 1 capsule Orally Once a day      ZINC ORAL Zinc 500 mg - 1 tablet Orally once a day      [DISCONTINUED] hydroCHLOROthiazide (HYDRODiuril) 12.5 mg tablet Take 1 tablet (12.5 mg) by mouth once daily.      [DISCONTINUED] potassium chloride (Klor-Con) 20 mEq packet Take 20 mEq by mouth once daily. M-W-F      [DISCONTINUED] sulfamethoxazole-trimethoprim (Bactrim) 200-40 mg/5 mL suspension Take by mouth once daily. M-W-F       No current facility-administered medications on file prior to visit.     Immunization History   Administered Date(s) Administered    DTaP vaccine, pediatric  (INFANRIX) 04/18/2012    Flu vaccine, quadrivalent, high-dose, preservative free, age 65y+ (FLUZONE) 11/19/2021, 10/10/2022, 10/10/2023    Influenza, High Dose Seasonal,  Preservative Free 12/13/2016, 12/13/2018, 12/04/2019    Influenza, Seasonal, Quadrivalent, Adjuvanted 10/02/2020    Influenza, seasonal, injectable 11/03/2010, 10/11/2012, 12/16/2015    Moderna SARS-CoV-2 Vaccination 02/05/2021, 03/05/2021, 11/19/2021    Pfizer COVID-19 vaccine, Fall 2023, 12 years and older, (30mcg/0.3mL) 11/29/2023    Pfizer COVID-19 vaccine, bivalent, age 12 years and older (30 mcg/0.3 mL) 11/26/2022    Pfizer Gray Cap SARS-CoV-2 06/01/2022    Pneumococcal conjugate vaccine, 13-valent (PREVNAR 13) 12/16/2015    Pneumococcal conjugate vaccine, 20-valent (PREVNAR 20) 05/04/2023    Pneumococcal polysaccharide vaccine, 23-valent, age 2 years and older (PNEUMOVAX 23) 04/01/2007, 12/13/2016    RSV, 60 Years And Older (AREXVY) 09/13/2023    Tdap vaccine, age 7 year and older (BOOSTRIX, ADACEL) 05/31/2022    Zoster vaccine, recombinant, adult (SHINGRIX) 05/31/2022, 10/26/2022    Zoster, live 03/31/2016     Patient's medical history was reviewed and updated either before or during this encounter.     Julio Bryant MD

## 2024-06-06 ENCOUNTER — HOSPITAL ENCOUNTER (OUTPATIENT)
Dept: RADIOLOGY | Facility: HOSPITAL | Age: 84
Discharge: HOME | End: 2024-06-06
Payer: MEDICARE

## 2024-06-06 VITALS — WEIGHT: 187 LBS | BODY MASS INDEX: 29.29 KG/M2

## 2024-06-06 DIAGNOSIS — N40.3 NODULAR PROSTATE WITH LOWER URINARY TRACT SYMPTOMS: ICD-10-CM

## 2024-06-06 PROCEDURE — 72197 MRI PELVIS W/O & W/DYE: CPT

## 2024-06-06 PROCEDURE — 2550000001 HC RX 255 CONTRASTS: Performed by: UROLOGY

## 2024-06-06 PROCEDURE — A9575 INJ GADOTERATE MEGLUMI 0.1ML: HCPCS | Performed by: UROLOGY

## 2024-06-06 RX ORDER — GADOTERATE MEGLUMINE 376.9 MG/ML
17 INJECTION INTRAVENOUS
Status: COMPLETED | OUTPATIENT
Start: 2024-06-06 | End: 2024-06-06

## 2024-06-06 RX ADMIN — GADOTERATE MEGLUMINE 17 ML: 376.9 INJECTION INTRAVENOUS at 13:48

## 2024-07-18 ENCOUNTER — APPOINTMENT (OUTPATIENT)
Dept: PREADMISSION TESTING | Facility: HOSPITAL | Age: 84
End: 2024-07-18
Payer: MEDICARE

## 2024-08-09 ENCOUNTER — APPOINTMENT (OUTPATIENT)
Dept: PREADMISSION TESTING | Facility: HOSPITAL | Age: 84
End: 2024-08-09
Payer: MEDICARE

## 2024-08-19 ENCOUNTER — PRE-ADMISSION TESTING (OUTPATIENT)
Dept: PREADMISSION TESTING | Facility: HOSPITAL | Age: 84
End: 2024-08-19
Payer: MEDICARE

## 2024-08-19 VITALS
RESPIRATION RATE: 16 BRPM | DIASTOLIC BLOOD PRESSURE: 87 MMHG | BODY MASS INDEX: 29.35 KG/M2 | HEIGHT: 67 IN | SYSTOLIC BLOOD PRESSURE: 157 MMHG | TEMPERATURE: 97.2 F | HEART RATE: 45 BPM | OXYGEN SATURATION: 97 % | WEIGHT: 187 LBS

## 2024-08-19 DIAGNOSIS — Z01.818 PRE-OP TESTING: Primary | ICD-10-CM

## 2024-08-19 PROCEDURE — 93010 ELECTROCARDIOGRAM REPORT: CPT | Performed by: INTERNAL MEDICINE

## 2024-08-19 PROCEDURE — 99204 OFFICE O/P NEW MOD 45 MIN: CPT | Performed by: PHYSICIAN ASSISTANT

## 2024-08-19 PROCEDURE — 93005 ELECTROCARDIOGRAM TRACING: CPT

## 2024-08-19 ASSESSMENT — DUKE ACTIVITY SCORE INDEX (DASI)
CAN YOU RUN A SHORT DISTANCE: YES
CAN YOU CLIMB A FLIGHT OF STAIRS OR WALK UP A HILL: YES
CAN YOU DO HEAVY WORK AROUND THE HOUSE LIKE SCRUBBING FLOORS OR LIFTING AND MOVING HEAVY FURNITURE: NO
TOTAL_SCORE: 42.7
CAN YOU TAKE CARE OF YOURSELF (EAT, DRESS, BATHE, OR USE TOILET): YES
CAN YOU HAVE SEXUAL RELATIONS: YES
CAN YOU PARTICIPATE IN STRENOUS SPORTS LIKE SWIMMING, SINGLES TENNIS, FOOTBALL, BASKETBALL, OR SKIING: NO
DASI METS SCORE: 8
CAN YOU WALK INDOORS, SUCH AS AROUND YOUR HOUSE: YES
CAN YOU DO MODERATE WORK AROUND THE HOUSE LIKE VACUUMING, SWEEPING FLOORS OR CARRYING GROCERIES: YES
CAN YOU DO YARD WORK LIKE RAKING LEAVES, WEEDING OR PUSHING A MOWER: YES
CAN YOU PARTICIPATE IN MODERATE RECREATIONAL ACTIVITIES LIKE GOLF, BOWLING, DANCING, DOUBLES TENNIS OR THROWING A BASEBALL OR FOOTBALL: YES
CAN YOU DO LIGHT WORK AROUND THE HOUSE LIKE DUSTING OR WASHING DISHES: YES
CAN YOU WALK A BLOCK OR TWO ON LEVEL GROUND: YES

## 2024-08-19 ASSESSMENT — ENCOUNTER SYMPTOMS: ARTHRALGIAS: 1

## 2024-08-19 NOTE — H&P (VIEW-ONLY)
"CPM/PAT Evaluation       Name: Boyd Johns (Boyd Johns)  /Age: 1940/84 y.o.     In-Person       Chief Complaint: \"elevated PSA\"    HPI  The patient is an 84 year old male.  He states he has an elevated PSA.  He denies dysuria, hematuria, nocturia, urinary incontinence, frequency or hesitancy.  He was seen by Dr. Lang and a prostate MRI on 2024 demonstrated a PI-RADS 5 lesion in the left posterolateral midglandperipheral zone.  A prostate biopsy is recommended.    Past Medical History:   Diagnosis Date    Atherosclerosis of native coronary artery of native heart without angina pectoris 2023    Benign prostatic hyperplasia 2023 PSA 1.68    CKD (chronic kidney disease)     Stage 3a    Constipation      colonoscopy [place] last 4 years, TSH nl , Miralax.    Degenerative joint disease 2023    Gastroesophageal reflux disease 2023    Granulomatosis with polyangiitis (Multi) 2023     rheumatology [facility]     History of pancreatitis 2023     work up neg.    HL (hearing loss)     Hyperlipidemia     Hypertension     Impaired fasting glucose 2023    Myocardial infarction (Multi)     PVCs (premature ventricular contractions) 2023    Vitamin D deficiency 2023       Past Surgical History:   Procedure Laterality Date    CHOLECYSTECTOMY      CORONARY STENT PLACEMENT  2013    X 2     Family History   Problem Relation Name Age of Onset    Pancreatic cancer Mother      Alzheimer's disease Father      Mental illness Father      Cancer Daughter      Heart disease Sibling       Social History     Tobacco Use    Smoking status: Never     Passive exposure: Never    Smokeless tobacco: Never   Substance Use Topics    Alcohol use: Yes     Comment: SOCIALLY     Social History     Substance and Sexual Activity   Drug Use Never     No Known Allergies    Current Outpatient Medications   Medication Sig Dispense Refill    " "acetaminophen (Tylenol) 325 mg tablet Take by mouth every 6 hours if needed for mild pain (1 - 3).      ascorbic acid, vitamin C, 1,000 mg ER tablet Take 1 tablet (1,000 mg) by mouth once daily.      aspirin 81 mg EC tablet 1 tablet Orally Once a day      calcium carbonate (CALCIUM 600 ORAL) Take by mouth.      cholecalciferol (Vitamin D-3) 5,000 Units tablet       ezetimibe (Zetia) 10 mg tablet TAKE 1 TABLET BY MOUTH ONCE  DAILY 90 tablet 3    folic acid (Folvite) 400 mcg tablet 1 tablet (0.4 mg).      GLUCOSAMINE SULFATE ORAL       hydroCHLOROthiazide (Microzide) 12.5 mg capsule Take 1 capsule (12.5 mg) by mouth once daily.      omeprazole OTC (PriLOSEC OTC) 20 mg EC tablet 1/2 tablet by mouth daily      polyethylene glycol (Glycolax, Miralax) 17 gram packet Take 17 g by mouth once daily.      potassium chloride CR 20 mEq ER tablet Take 1 tablet (20 mEq) by mouth once a day on Monday, Wednesday, and Friday.      rosuvastatin (Crestor) 10 mg tablet TAKE 1 TABLET BY MOUTH ONCE  DAILY 90 tablet 3    ubidecarenone (CO Q-10 ORAL) Take by mouth.      VITAMIN B COMPLEX ORAL 1000mcg Orally Daily      vitamin E 180 mg (400 unit) capsule 1 capsule Orally Once a day      ZINC ORAL Zinc 500 mg - 1 tablet Orally once a day       No current facility-administered medications for this visit.     Review of Systems   HENT:  Positive for hearing loss.    Genitourinary:         See HPI   Musculoskeletal:  Positive for arthralgias.   All other systems reviewed and are negative.    /87   Pulse (!) 45   Temp 36.2 °C (97.2 °F) (Temporal)   Resp 16   Ht 1.702 m (5' 7\")   Wt 84.8 kg (187 lb)   SpO2 97%   BMI 29.29 kg/m²     Physical Exam  Vitals reviewed.   Constitutional:       Appearance: Normal appearance.   HENT:      Head: Normocephalic and atraumatic.      Mouth/Throat:      Mouth: Mucous membranes are moist.      Pharynx: Oropharynx is clear.   Eyes:      Extraocular Movements: Extraocular movements intact.      Pupils: " Pupils are equal, round, and reactive to light.   Cardiovascular:      Rate and Rhythm: Regular rhythm. Bradycardia present.      Heart sounds: Normal heart sounds.   Pulmonary:      Effort: Pulmonary effort is normal.      Breath sounds: Normal breath sounds.   Abdominal:      General: Bowel sounds are normal.      Palpations: Abdomen is soft.   Musculoskeletal:         General: No swelling.   Skin:     General: Skin is warm and dry.   Neurological:      General: No focal deficit present.      Mental Status: He is alert and oriented to person, place, and time.   Psychiatric:         Mood and Affect: Mood normal.         Behavior: Behavior normal.        PAT AIRWAY:   Airway:     Mallampati::  IV    TM distance::  >3 FB    Neck ROM::  Full   3 left upper missing teeth noted.      ASA:  II  DASI SCORE:  42.7  METS SCORE:  8  CHAD2 SCORE:  4.0%  REVISED CARDIAC RISK INDEX:  0.9%  STOP BANG SCORE:  2    EKG (preliminary in PAT) - sinus bradycardia with 1st degree AV block  CBC, BMP done 7/25/2024    Assessment and Plan:     Elevated prostate specific antigen:  Prostate biopsy with navigation  Myocardial Infarction 2013  CAD - s/p PCI/stent X 2  3013 - currently stable - followed by Dr. Charlie Mejia  Hypertension - taking hydrochlorothiazide  Chronic Kidney Disease - stage 3a    Yaz Ramires PA-C

## 2024-08-19 NOTE — CPM/PAT H&P
"CPM/PAT Evaluation       Name: Boyd Johns (Boyd Johns)  /Age: 1940/84 y.o.     In-Person       Chief Complaint: \"elevated PSA\"    HPI  The patient is an 84 year old male.  He states he has an elevated PSA.  He denies dysuria, hematuria, nocturia, urinary incontinence, frequency or hesitancy.  He was seen by Dr. Lang and a prostate MRI on 2024 demonstrated a PI-RADS 5 lesion in the left posterolateral midglandperipheral zone.  A prostate biopsy is recommended.    Past Medical History:   Diagnosis Date    Atherosclerosis of native coronary artery of native heart without angina pectoris 2023    Benign prostatic hyperplasia 2023 PSA 1.68    CKD (chronic kidney disease)     Stage 3a    Constipation      colonoscopy [place] last 4 years, TSH nl , Miralax.    Degenerative joint disease 2023    Gastroesophageal reflux disease 2023    Granulomatosis with polyangiitis (Multi) 2023     rheumatology [facility]     History of pancreatitis 2023     work up neg.    HL (hearing loss)     Hyperlipidemia     Hypertension     Impaired fasting glucose 2023    Myocardial infarction (Multi)     PVCs (premature ventricular contractions) 2023    Vitamin D deficiency 2023       Past Surgical History:   Procedure Laterality Date    CHOLECYSTECTOMY      CORONARY STENT PLACEMENT  2013    X 2     Family History   Problem Relation Name Age of Onset    Pancreatic cancer Mother      Alzheimer's disease Father      Mental illness Father      Cancer Daughter      Heart disease Sibling       Social History     Tobacco Use    Smoking status: Never     Passive exposure: Never    Smokeless tobacco: Never   Substance Use Topics    Alcohol use: Yes     Comment: SOCIALLY     Social History     Substance and Sexual Activity   Drug Use Never     No Known Allergies    Current Outpatient Medications   Medication Sig Dispense Refill    " "acetaminophen (Tylenol) 325 mg tablet Take by mouth every 6 hours if needed for mild pain (1 - 3).      ascorbic acid, vitamin C, 1,000 mg ER tablet Take 1 tablet (1,000 mg) by mouth once daily.      aspirin 81 mg EC tablet 1 tablet Orally Once a day      calcium carbonate (CALCIUM 600 ORAL) Take by mouth.      cholecalciferol (Vitamin D-3) 5,000 Units tablet       ezetimibe (Zetia) 10 mg tablet TAKE 1 TABLET BY MOUTH ONCE  DAILY 90 tablet 3    folic acid (Folvite) 400 mcg tablet 1 tablet (0.4 mg).      GLUCOSAMINE SULFATE ORAL       hydroCHLOROthiazide (Microzide) 12.5 mg capsule Take 1 capsule (12.5 mg) by mouth once daily.      omeprazole OTC (PriLOSEC OTC) 20 mg EC tablet 1/2 tablet by mouth daily      polyethylene glycol (Glycolax, Miralax) 17 gram packet Take 17 g by mouth once daily.      potassium chloride CR 20 mEq ER tablet Take 1 tablet (20 mEq) by mouth once a day on Monday, Wednesday, and Friday.      rosuvastatin (Crestor) 10 mg tablet TAKE 1 TABLET BY MOUTH ONCE  DAILY 90 tablet 3    ubidecarenone (CO Q-10 ORAL) Take by mouth.      VITAMIN B COMPLEX ORAL 1000mcg Orally Daily      vitamin E 180 mg (400 unit) capsule 1 capsule Orally Once a day      ZINC ORAL Zinc 500 mg - 1 tablet Orally once a day       No current facility-administered medications for this visit.     Review of Systems   HENT:  Positive for hearing loss.    Genitourinary:         See HPI   Musculoskeletal:  Positive for arthralgias.   All other systems reviewed and are negative.    /87   Pulse (!) 45   Temp 36.2 °C (97.2 °F) (Temporal)   Resp 16   Ht 1.702 m (5' 7\")   Wt 84.8 kg (187 lb)   SpO2 97%   BMI 29.29 kg/m²     Physical Exam  Vitals reviewed.   Constitutional:       Appearance: Normal appearance.   HENT:      Head: Normocephalic and atraumatic.      Mouth/Throat:      Mouth: Mucous membranes are moist.      Pharynx: Oropharynx is clear.   Eyes:      Extraocular Movements: Extraocular movements intact.      Pupils: " Pupils are equal, round, and reactive to light.   Cardiovascular:      Rate and Rhythm: Regular rhythm. Bradycardia present.      Heart sounds: Normal heart sounds.   Pulmonary:      Effort: Pulmonary effort is normal.      Breath sounds: Normal breath sounds.   Abdominal:      General: Bowel sounds are normal.      Palpations: Abdomen is soft.   Musculoskeletal:         General: No swelling.   Skin:     General: Skin is warm and dry.   Neurological:      General: No focal deficit present.      Mental Status: He is alert and oriented to person, place, and time.   Psychiatric:         Mood and Affect: Mood normal.         Behavior: Behavior normal.        PAT AIRWAY:   Airway:     Mallampati::  IV    TM distance::  >3 FB    Neck ROM::  Full   3 left upper missing teeth noted.      ASA:  II  DASI SCORE:  42.7  METS SCORE:  8  CHAD2 SCORE:  4.0%  REVISED CARDIAC RISK INDEX:  0.9%  STOP BANG SCORE:  2    EKG (preliminary in PAT) - sinus bradycardia with 1st degree AV block  CBC, BMP done 7/25/2024    Assessment and Plan:     Elevated prostate specific antigen:  Prostate biopsy with navigation  Myocardial Infarction 2013  CAD - s/p PCI/stent X 2  3013 - currently stable - followed by Dr. Charlie Mejia  Hypertension - taking hydrochlorothiazide  Chronic Kidney Disease - stage 3a    Yaz Ramires PA-C

## 2024-08-19 NOTE — PREPROCEDURE INSTRUCTIONS
PAT DISCHARGE INSTRUCTIONS    Please call the Same Day Surgery (SDS) Department of the hospital where your procedure will be performed after 2:00 PM the day before your surgery. If you are scheduled on a Monday, or a Tuesday following a Monday holiday, you will need to call on the last business day prior to your surgery.    [facility]  [address] [address], [address]  [phone]    [facility]  [address] [place] [address] [phone]    [facility]  [address]  [place] [id number]  [phone]    Please let your surgeon know if:      You develop any open sores, shingles, burning or painful urination as these may increase your risk of an infection.   You no longer wish to have the surgery.   Any other personal circumstances change that may lead to the need to cancel or defer this surgery-such as being sick or getting admitted to any hospital within one week of your planned procedure.    Your contact details change, such as a change of address or phone number.    Starting now:     Please DO NOT drink alcohol or smoke for 24 hours before surgery. It is well known that quitting smoking can make a huge difference to your health and recovery from surgery. The longer you abstain from smoking, the better your chances of a healthy recovery. If you need help with quitting, call 1-800-QUIT-NOW to be connected to a trained counselor who will discuss the best methods to help you quit.     Before your surgery:    Please stop all supplements 7 days prior to surgery. Or as directed by your surgeon.   Please stop taking NSAID pain medicine such as Advil and Motrin 7 days before surgery.    If you develop any fever, cough, cold, rashes, cuts, scratches, scrapes, urinary symptoms or infection anywhere on your body (including teeth and gums) prior to surgery, please call your surgeon’s office as soon as  possible. This may require treatment to reduce the chance of cancellation on the day of surgery.    The day before your surgery:   Get a good night’s rest.  Use the special soap for bathing if you have been instructed to use one.    Scheduled surgery times may change and you will be notified if this occurs - please check your personal voicemail for any updates.     On the morning of surgery:   Wear comfortable, loose fitting clothes which open in the front. Please do not wear moisturizers, creams, lotions, makeup or perfume.    Please bring with you to surgery:   Photo ID and insurance card   Current list of medicines and allergies   Pacemaker/ Defibrillator/Heart stent cards   CPAP machine and mask    Slings/ splints/ crutches   A copy of your complete advanced directive/DHPOA.    Please do NOT bring with you to surgery:   All jewelry and valuables should be left at home.   Prosthetic devices such as contact lenses, hearing aids, dentures, eyelash extensions, hairpins and body piercings must be removed prior to going in to the surgical suite.    After outpatient surgery:   A responsible adult MUST accompany you at the time of discharge and stay with you for 24 hours after your surgery. You may NOT drive yourself home after surgery.    Do not drive, operate machinery, make critical decisions or do activities that require co-ordination or balance until after a night’s sleep.   Do not drink alcoholic beverages for 24 hours.   Instructions for resuming your medications will be provided by your surgeon.    CALL YOUR DOCTOR AFTER SURGERY IF YOU HAVE:     Chills and/or a fever of 101° F or higher.    Redness, swelling, pus or drainage from your surgical wound or a bad smell from the wound.    Lightheadedness, fainting or confusion.    Persistent vomiting (throwing up) and are not able to eat or drink for 12 hours.    Three or more loose, watery bowel movements in 24 hours (diarrhea).   Difficulty or pain while urinating(  after non-urological surgery)    Pain and swelling in your legs, especially if it is only on one side.    Difficulty breathing or are breathing faster than normal.    Any new concerning symptoms.          Preoperative Fasting Guidelines    Why must I stop eating and drinking near surgery time?  With sedation, food or liquid in your stomach can enter your lungs causing serious complications  Increases nausea and vomiting    When do I need to stop eating and drinking before my surgery?  Do not eat any food after midnight the night before your surgery/procedure.  You may have up to 13 ounces of clear liquid until TWO hours before your instructed arrival time to the hospital.  This includes water, black tea/coffee, (no milk or cream) apple juice, and electrolyte drinks (Gatorade)  You may chew gum until TWO hours before your surgery/procedure     Medication List            Accurate as of August 19, 2024  8:46 AM. Always use your most recent med list.                acetaminophen 325 mg tablet  Commonly known as: Tylenol  Medication Adjustments for Surgery: Other (Comment)  Notes to patient: CONTINUE IF NEEDED     ascorbic acid (vitamin C) 1,000 mg ER tablet  Medication Adjustments for Surgery: Stop 7 days before surgery     aspirin 81 mg EC tablet  Medication Adjustments for Surgery: Stop 7 days before surgery     CALCIUM 600 ORAL  Medication Adjustments for Surgery: Stop 7 days before surgery     cholecalciferol 5,000 Units tablet  Commonly known as: Vitamin D-3  Medication Adjustments for Surgery: Stop 7 days before surgery     CO Q-10 ORAL  Medication Adjustments for Surgery: Stop 7 days before surgery     ezetimibe 10 mg tablet  Commonly known as: Zetia  TAKE 1 TABLET BY MOUTH ONCE  DAILY  Medication Adjustments for Surgery: Other (Comment)  Notes to patient: CONTINUE PER USUAL/TAKE NIGHT BEFORE SURGERY     folic acid 400 mcg tablet  Commonly known as: Folvite  Medication Adjustments for Surgery: Stop 7 days before  surgery     GLUCOSAMINE SULFATE ORAL  Medication Adjustments for Surgery: Stop 7 days before surgery     hydroCHLOROthiazide 12.5 mg capsule  Commonly known as: Microzide  Medication Adjustments for Surgery: Take morning of surgery with sip of water, no other fluids     omeprazole OTC 20 mg EC tablet  Commonly known as: PriLOSEC OTC  Medication Adjustments for Surgery: Take morning of surgery with sip of water, no other fluids     polyethylene glycol 17 gram packet  Commonly known as: Glycolax, Miralax  Medication Adjustments for Surgery: Other (Comment)  Notes to patient: CONTINUE IF NEEDED     potassium chloride CR 20 mEq ER tablet  Commonly known as: Klor-Con M20  Medication Adjustments for Surgery: Take morning of surgery with sip of water, no other fluids     rosuvastatin 10 mg tablet  Commonly known as: Crestor  TAKE 1 TABLET BY MOUTH ONCE  DAILY  Medication Adjustments for Surgery: Other (Comment)  Notes to patient: CONTINUE PER USUAL/TAKE NIGHT BEFORE SURGERY     VITAMIN B COMPLEX ORAL  Medication Adjustments for Surgery: Stop 7 days before surgery     vitamin E 180 mg (400 unit) capsule  Medication Adjustments for Surgery: Stop 7 days before surgery     ZINC ORAL  Medication Adjustments for Surgery: Stop 7 days before surgery

## 2024-08-20 LAB
ATRIAL RATE: 46 BPM
P AXIS: 34 DEGREES
P OFFSET: 159 MS
P ONSET: 99 MS
PR INTERVAL: 236 MS
Q ONSET: 217 MS
QRS COUNT: 7 BEATS
QRS DURATION: 98 MS
QT INTERVAL: 444 MS
QTC CALCULATION(BAZETT): 388 MS
QTC FREDERICIA: 406 MS
R AXIS: -10 DEGREES
T AXIS: 29 DEGREES
T OFFSET: 439 MS
VENTRICULAR RATE: 46 BPM

## 2024-08-23 ENCOUNTER — HOSPITAL ENCOUNTER (OUTPATIENT)
Facility: HOSPITAL | Age: 84
Setting detail: OUTPATIENT SURGERY
Discharge: HOME | End: 2024-08-23
Attending: UROLOGY | Admitting: UROLOGY
Payer: MEDICARE

## 2024-08-23 ENCOUNTER — ANESTHESIA EVENT (OUTPATIENT)
Dept: OPERATING ROOM | Facility: HOSPITAL | Age: 84
End: 2024-08-23
Payer: MEDICARE

## 2024-08-23 ENCOUNTER — ANESTHESIA (OUTPATIENT)
Dept: OPERATING ROOM | Facility: HOSPITAL | Age: 84
End: 2024-08-23
Payer: MEDICARE

## 2024-08-23 VITALS
DIASTOLIC BLOOD PRESSURE: 92 MMHG | SYSTOLIC BLOOD PRESSURE: 141 MMHG | HEIGHT: 67 IN | OXYGEN SATURATION: 94 % | BODY MASS INDEX: 29.31 KG/M2 | RESPIRATION RATE: 16 BRPM | TEMPERATURE: 97.2 F | HEART RATE: 51 BPM | WEIGHT: 186.73 LBS

## 2024-08-23 DIAGNOSIS — R97.20 ELEVATED PROSTATE SPECIFIC ANTIGEN (PSA): ICD-10-CM

## 2024-08-23 DIAGNOSIS — N40.2 NODULAR PROSTATE: Primary | ICD-10-CM

## 2024-08-23 PROCEDURE — 3600000004 HC OR TIME - INITIAL BASE CHARGE - PROCEDURE LEVEL FOUR: Performed by: UROLOGY

## 2024-08-23 PROCEDURE — 2500000004 HC RX 250 GENERAL PHARMACY W/ HCPCS (ALT 636 FOR OP/ED): Performed by: UROLOGY

## 2024-08-23 PROCEDURE — 7100000002 HC RECOVERY ROOM TIME - EACH INCREMENTAL 1 MINUTE: Performed by: UROLOGY

## 2024-08-23 PROCEDURE — 3700000001 HC GENERAL ANESTHESIA TIME - INITIAL BASE CHARGE: Performed by: UROLOGY

## 2024-08-23 PROCEDURE — 2720000007 HC OR 272 NO HCPCS: Performed by: UROLOGY

## 2024-08-23 PROCEDURE — 76942 ECHO GUIDE FOR BIOPSY: CPT | Performed by: UROLOGY

## 2024-08-23 PROCEDURE — 7100000010 HC PHASE TWO TIME - EACH INCREMENTAL 1 MINUTE: Performed by: UROLOGY

## 2024-08-23 PROCEDURE — 2500000004 HC RX 250 GENERAL PHARMACY W/ HCPCS (ALT 636 FOR OP/ED): Performed by: NURSE ANESTHETIST, CERTIFIED REGISTERED

## 2024-08-23 PROCEDURE — 88344 IMHCHEM/IMCYTCHM EA MLT ANTB: CPT | Mod: TC,SUR,BEALAB,WESLAB | Performed by: UROLOGY

## 2024-08-23 PROCEDURE — 55700 PR PROSTATE NEEDLE BIOPSY ANY APPROACH: CPT | Performed by: UROLOGY

## 2024-08-23 PROCEDURE — 3600000009 HC OR TIME - EACH INCREMENTAL 1 MINUTE - PROCEDURE LEVEL FOUR: Performed by: UROLOGY

## 2024-08-23 PROCEDURE — 3700000002 HC GENERAL ANESTHESIA TIME - EACH INCREMENTAL 1 MINUTE: Performed by: UROLOGY

## 2024-08-23 PROCEDURE — 7100000009 HC PHASE TWO TIME - INITIAL BASE CHARGE: Performed by: UROLOGY

## 2024-08-23 PROCEDURE — 7100000001 HC RECOVERY ROOM TIME - INITIAL BASE CHARGE: Performed by: UROLOGY

## 2024-08-23 PROCEDURE — C1819 TISSUE LOCALIZATION-EXCISION: HCPCS | Performed by: UROLOGY

## 2024-08-23 RX ORDER — SODIUM CHLORIDE, SODIUM LACTATE, POTASSIUM CHLORIDE, CALCIUM CHLORIDE 600; 310; 30; 20 MG/100ML; MG/100ML; MG/100ML; MG/100ML
INJECTION, SOLUTION INTRAVENOUS CONTINUOUS PRN
Status: DISCONTINUED | OUTPATIENT
Start: 2024-08-23 | End: 2024-08-23

## 2024-08-23 RX ORDER — SULFAMETHOXAZOLE AND TRIMETHOPRIM 800; 160 MG/1; MG/1
1 TABLET ORAL 2 TIMES DAILY
COMMUNITY
End: 2024-08-23 | Stop reason: HOSPADM

## 2024-08-23 RX ORDER — LIDOCAINE HYDROCHLORIDE 10 MG/ML
0.1 INJECTION, SOLUTION EPIDURAL; INFILTRATION; INTRACAUDAL; PERINEURAL ONCE
Status: DISCONTINUED | OUTPATIENT
Start: 2024-08-23 | End: 2024-08-23 | Stop reason: HOSPADM

## 2024-08-23 RX ORDER — SODIUM CHLORIDE, SODIUM LACTATE, POTASSIUM CHLORIDE, CALCIUM CHLORIDE 600; 310; 30; 20 MG/100ML; MG/100ML; MG/100ML; MG/100ML
100 INJECTION, SOLUTION INTRAVENOUS CONTINUOUS
Status: DISCONTINUED | OUTPATIENT
Start: 2024-08-23 | End: 2024-08-23 | Stop reason: HOSPADM

## 2024-08-23 RX ORDER — SULFAMETHOXAZOLE AND TRIMETHOPRIM 800; 160 MG/1; MG/1
1 TABLET ORAL 2 TIMES DAILY
Qty: 6 TABLET | Refills: 0 | Status: SHIPPED | OUTPATIENT
Start: 2024-08-23

## 2024-08-23 RX ORDER — PROPOFOL 10 MG/ML
INJECTION, EMULSION INTRAVENOUS AS NEEDED
Status: DISCONTINUED | OUTPATIENT
Start: 2024-08-23 | End: 2024-08-23

## 2024-08-23 RX ORDER — MEPERIDINE HYDROCHLORIDE 25 MG/ML
12.5 INJECTION INTRAMUSCULAR; INTRAVENOUS; SUBCUTANEOUS EVERY 10 MIN PRN
Status: DISCONTINUED | OUTPATIENT
Start: 2024-08-23 | End: 2024-08-23 | Stop reason: HOSPADM

## 2024-08-23 RX ORDER — FENTANYL CITRATE 50 UG/ML
25 INJECTION, SOLUTION INTRAMUSCULAR; INTRAVENOUS EVERY 5 MIN PRN
Status: DISCONTINUED | OUTPATIENT
Start: 2024-08-23 | End: 2024-08-23 | Stop reason: HOSPADM

## 2024-08-23 RX ORDER — MIDAZOLAM HYDROCHLORIDE 1 MG/ML
INJECTION, SOLUTION INTRAMUSCULAR; INTRAVENOUS AS NEEDED
Status: DISCONTINUED | OUTPATIENT
Start: 2024-08-23 | End: 2024-08-23

## 2024-08-23 RX ORDER — OXYCODONE HYDROCHLORIDE 5 MG/1
5 TABLET ORAL EVERY 4 HOURS PRN
Status: DISCONTINUED | OUTPATIENT
Start: 2024-08-23 | End: 2024-08-23 | Stop reason: HOSPADM

## 2024-08-23 RX ORDER — CEFTRIAXONE 2 G/50ML
2 INJECTION, SOLUTION INTRAVENOUS EVERY 24 HOURS
Status: DISCONTINUED | OUTPATIENT
Start: 2024-08-23 | End: 2024-08-23 | Stop reason: HOSPADM

## 2024-08-23 RX ORDER — FENTANYL CITRATE 50 UG/ML
50 INJECTION, SOLUTION INTRAMUSCULAR; INTRAVENOUS EVERY 5 MIN PRN
Status: DISCONTINUED | OUTPATIENT
Start: 2024-08-23 | End: 2024-08-23 | Stop reason: HOSPADM

## 2024-08-23 RX ORDER — LABETALOL HYDROCHLORIDE 5 MG/ML
5 INJECTION, SOLUTION INTRAVENOUS ONCE AS NEEDED
Status: DISCONTINUED | OUTPATIENT
Start: 2024-08-23 | End: 2024-08-23 | Stop reason: HOSPADM

## 2024-08-23 RX ORDER — ONDANSETRON HYDROCHLORIDE 2 MG/ML
4 INJECTION, SOLUTION INTRAVENOUS ONCE AS NEEDED
Status: DISCONTINUED | OUTPATIENT
Start: 2024-08-23 | End: 2024-08-23 | Stop reason: HOSPADM

## 2024-08-23 RX ORDER — FENTANYL CITRATE 50 UG/ML
INJECTION, SOLUTION INTRAMUSCULAR; INTRAVENOUS AS NEEDED
Status: DISCONTINUED | OUTPATIENT
Start: 2024-08-23 | End: 2024-08-23

## 2024-08-23 ASSESSMENT — PAIN - FUNCTIONAL ASSESSMENT
PAIN_FUNCTIONAL_ASSESSMENT: 0-10

## 2024-08-23 ASSESSMENT — PAIN SCALES - GENERAL
PAINLEVEL_OUTOF10: 0 - NO PAIN

## 2024-08-23 ASSESSMENT — COLUMBIA-SUICIDE SEVERITY RATING SCALE - C-SSRS
6. HAVE YOU EVER DONE ANYTHING, STARTED TO DO ANYTHING, OR PREPARED TO DO ANYTHING TO END YOUR LIFE?: NO
1. IN THE PAST MONTH, HAVE YOU WISHED YOU WERE DEAD OR WISHED YOU COULD GO TO SLEEP AND NOT WAKE UP?: NO
2. HAVE YOU ACTUALLY HAD ANY THOUGHTS OF KILLING YOURSELF?: NO

## 2024-08-23 NOTE — OP NOTE
Biopsy Prostate with Navigation, Ultrasonography Transrectal Prostate ( URO SERGEI, ULTRASOUND AND TECH, MRI DONE AT  24) Operative Note     Date: 2024  OR Location: [facility]    Name: Boyd Johns, : 1940, Age: 84 y.o., MRN: [id number], Sex: male    Diagnosis  Nodular prostate Nodular prostate     Procedures  Biopsy Prostate with Navigation  82265 - MD PROSTATE NEEDLE BIOPSY ANY APPROACH    Ultrasonography Transrectal Prostate ( URO SERGEI, ULTRASOUND AND TECH, MRI DONE AT  24)  17252 - CHG US TRANSRECTAL      Surgeons      * Anthony Lang - Primary    Resident/Fellow/Other Assistant:  Surgeons and Role:  * No surgeons found with a matching role *    Procedure Summary  Anesthesia: Monitor Anesthesia Care  ASA: III  Anesthesia Staff: Anesthesiologist: Trace Reyes MD  CRNA: SKYE Mazariegos-CRNA  Estimated Blood Loss: 5 mL  Intra-op Medications: Administrations occurring from 0700 to 0745 on 24:  * No intraprocedure medications in log *           Anesthesia Record               Intraprocedure I/O Totals       None           Specimen:   ID Type Source Tests Collected by Time   1 : RIGHT MID MEDIAL Tissue PROSTATE, BIOPSY, RIGHT MEDIAL MID SURGICAL PATHOLOGY EXAM Anthony Lang MD 2024 07   2 : RIGHT BASE MEDIAL Tissue PROSTATE, BIOPSY, RIGHT MEDIAL BASE SURGICAL PATHOLOGY EXAM Anthony Lang MD 2024 07   3 : RIGHT APEX MEDIAL Tissue PROSTATE, BIOPSY, RIGHT MEDIAL APEX SURGICAL PATHOLOGY EXAM Anthony Lang MD 2024 0701   4 : RIGHT MID LATERAL Tissue PROSTATE, BIOPSY, RIGHT LATERAL MID SURGICAL PATHOLOGY EXAM Anthony Lang MD 2024 0701   5 : RIGHT APEX LATERAL Tissue PROSTATE, BIOPSY, RIGHT LATERAL APEX SURGICAL PATHOLOGY EXAM Anthony Lang MD 2024 0701   6 : RIGHT BASE LATERAL Tissue PROSTATE, BIOPSY, RIGHT LATERAL BASE SURGICAL PATHOLOGY EXAM Anthony Lang MD 2024 07   7 : AREA OF INTEREST 1 Tissue PROSTATE BIOPSY TARGETED  [place] SURGICAL PATHOLOGY EXAM Anthony Lang MD 8/23/2024 0701   8 : LEFT APEX LATERAL Tissue PROSTATE, BIOPSY, LEFT LATERAL APEX SURGICAL PATHOLOGY EXAM Anthony Lang MD 8/23/2024 0701   9 : LEFT MID MEDIAL Tissue PROSTATE, BIOPSY, LEFT MEDIAL MID SURGICAL PATHOLOGY EXAM Anthony Lang MD 8/23/2024 0701   10 : LEFT BASE LATERAL Tissue PROSTATE, BIOPSY, LEFT LATERAL BASE SURGICAL PATHOLOGY EXAM Anthony Lang MD 8/23/2024 0701   11 : LEFT MID LATERAL Tissue PROSTATE, BIOPSY, LEFT LATERAL MID SURGICAL PATHOLOGY EXAM Anthony Lang MD 8/23/2024 0701   12 : LEFT APEX MEDIAL Tissue PROSTATE, BIOPSY, LEFT MEDIAL APEX SURGICAL PATHOLOGY EXAM Anthony Lang MD 8/23/2024 0701   13 : LEFT BASE MEDIAL Tissue PROSTATE, BIOPSY, LEFT MEDIAL BASE SURGICAL PATHOLOGY EXAM Anthony Lang MD 8/23/2024 0701        Staff:   Circulator: Adela Chapmanub Person: Ilona           Drains and/or Catheters: None    Findings: PI-RADS 5 lesion biopsied    Indications: Boyd Johns is an 84 y.o. male who is having surgery for a nodular prostate.  MRI identified a PI-RADS 5 lesion.  He presents for an MRI fusion guided biopsy.  Risks including infection and bleeding were reviewed.  Written consent was obtained.    Procedure Details: Patient was taken to the operating given MAC anesthesia.  He was placed in the left left lateral decubitus position.  A transrectal ultrasound probe was inserted.     The prostate was visualized with ultrasound and the lesion in the left lateral posterior prostate was visualized.  Ultrasound was used for needle guidance.    The PI-RADS 5 lesion in the left lateral posterior prostate was biopsied.  Standard biopsies were obtained.  The patient tolerated the procedure and was transferred to the recovery in stable condition.    Complications:  None; patient tolerated the procedure well.              Anthony Lang  Phone Number: [phone]

## 2024-08-23 NOTE — ANESTHESIA POSTPROCEDURE EVALUATION
Patient: Boyd Johns    Procedure Summary       Date: 08/23/24 Room / Location: [facility] OR 01 / Virtual [facility]    Anesthesia Start: 0653 Anesthesia Stop: 0719    Procedures:       Biopsy Prostate with Navigation      Ultrasonography Transrectal Prostate ( URO SERGEI, ULTRASOUND AND TECH, MRI DONE AT  6/6/24) Diagnosis:       Elevated prostate specific antigen (PSA)      (ELEVATED PSA R97.20)    Surgeons: Anthony Lang MD Responsible Provider: Trace Reyes MD    Anesthesia Type: MAC ASA Status: 3            Anesthesia Type: MAC    Vitals Value Taken Time   /71 08/23/24 0730   Temp 36.9 °C (98.4 °F) 08/23/24 0715   Pulse 53 08/23/24 0730   Resp 16 08/23/24 0730   SpO2 92 % 08/23/24 0730       Anesthesia Post Evaluation    Patient location during evaluation: PACU  Patient participation: complete - patient participated  Level of consciousness: awake  Pain management: adequate  Airway patency: patent  Cardiovascular status: acceptable  Respiratory status: acceptable  Hydration status: acceptable  Postoperative Nausea and Vomiting: none        No notable events documented.

## 2024-08-23 NOTE — ANESTHESIA PREPROCEDURE EVALUATION
Patient: Boyd Johns    Procedure Information       Date/Time: 08/23/24 0700    Procedures:       Biopsy Prostate with Navigation      Ultrasonography Transrectal Prostate ( URO SERGEI, ULTRASOUND AND TECH, MRI DONE AT  6/6/24)    Location: [facility] 01 / [facility]    Surgeons: Anthony Lang MD            Relevant Problems   Cardiac   (+) Atherosclerosis of native coronary artery of native heart without angina pectoris   (+) Essential hypertension   (+) PAC (premature atrial contraction)   (+) PVCs (premature ventricular contractions)   (+) Pure hypercholesterolemia      GI   (+) Gastroesophageal reflux disease      /Renal   (+) Benign prostatic hyperplasia      Endocrine   (+) Acquired hypothyroidism      Musculoskeletal   (+) Degenerative joint disease      HEENT   (+) Hard of hearing       Clinical information reviewed:   Tobacco  Allergies  Meds   Med Hx  Surg Hx   Fam Hx  Soc Hx        NPO Detail:  No data recorded     Physical Exam    Airway  Mallampati: II  TM distance: >3 FB  Neck ROM: full     Cardiovascular    Dental    Pulmonary    Abdominal            Anesthesia Plan    History of general anesthesia?: yes  History of complications of general anesthesia?: no    ASA 3     MAC     intravenous induction   Anesthetic plan and risks discussed with patient.    Plan discussed with CRNA.

## 2024-08-26 ASSESSMENT — PAIN SCALES - GENERAL: PAINLEVEL_OUTOF10: 0 - NO PAIN

## 2024-09-11 LAB
LAB AP ASR DISCLAIMER: NORMAL
LABORATORY COMMENT REPORT: NORMAL
PATH REPORT.FINAL DX SPEC: NORMAL
PATH REPORT.GROSS SPEC: NORMAL
PATH REPORT.RELEVANT HX SPEC: NORMAL
PATH REPORT.TOTAL CANCER: NORMAL

## 2024-09-16 ENCOUNTER — APPOINTMENT (OUTPATIENT)
Dept: UROLOGY | Facility: CLINIC | Age: 84
End: 2024-09-16
Payer: MEDICARE

## 2024-09-16 DIAGNOSIS — N52.1 ERECTILE DYSFUNCTION DUE TO DISEASES CLASSIFIED ELSEWHERE: ICD-10-CM

## 2024-09-16 DIAGNOSIS — C61 PROSTATE CANCER (MULTI): ICD-10-CM

## 2024-09-16 PROCEDURE — 99215 OFFICE O/P EST HI 40 MIN: CPT | Performed by: UROLOGY

## 2024-09-16 PROCEDURE — 1036F TOBACCO NON-USER: CPT | Performed by: UROLOGY

## 2024-09-16 PROCEDURE — 1157F ADVNC CARE PLAN IN RCRD: CPT | Performed by: UROLOGY

## 2024-09-16 PROCEDURE — 1159F MED LIST DOCD IN RCRD: CPT | Performed by: UROLOGY

## 2024-09-16 PROCEDURE — 1126F AMNT PAIN NOTED NONE PRSNT: CPT | Performed by: UROLOGY

## 2024-09-16 RX ORDER — TADALAFIL 20 MG/1
20 TABLET ORAL DAILY PRN
Qty: 100 TABLET | Refills: 2 | Status: SHIPPED | OUTPATIENT
Start: 2024-09-16 | End: 2025-07-13

## 2024-09-16 RX ORDER — AMOXICILLIN 500 MG/1
1 CAPSULE ORAL
COMMUNITY
Start: 2024-09-11

## 2024-09-16 ASSESSMENT — PAIN SCALES - GENERAL: PAINLEVEL: 0-NO PAIN

## 2024-09-16 NOTE — PROGRESS NOTES
"  Patient is a 84 y.o. male presenting with prostate cancer     SUBJECTIVE:  HPI   Prostate biopsy identified a [place] 3+4 equal 7 in the left mid lateral region, and several [place] 6 positive biopsies on the left.  MRI was positive for a PI-RADS 5 lesion on the left which identified Mariella 7, which adjacent to the capsule, however extraprostatic extension was not identified.          No results found for: \"URINECULTURE\"     Past Medical History:   Diagnosis Date    Atherosclerosis of native coronary artery of native heart without angina pectoris 09/06/2023    Benign prostatic hyperplasia 09/06/2023 1/24 PSA 1.68    CKD (chronic kidney disease)     Stage 3a    Constipation      colonoscopy OK last 4 years, TSH nl 1/24, Miralax.    Degenerative joint disease 09/06/2023    Gastroesophageal reflux disease 09/06/2023    Granulomatosis with polyangiitis (Multi) 09/06/2023     rheumatology [facility]     History of pancreatitis 11/07/2023     work up neg.    HL (hearing loss)     Hyperlipidemia     Hypertension     Impaired fasting glucose 09/06/2023    Myocardial infarction (Multi) 2013    PVCs (premature ventricular contractions) 09/06/2023    Vitamin D deficiency 09/06/2023      Past Surgical History:   Procedure Laterality Date    CHOLECYSTECTOMY      CORONARY STENT PLACEMENT  2013    X 2      Family History   Problem Relation Name Age of Onset    Pancreatic cancer Mother      Alzheimer's disease Father      Mental illness Father      Cancer Daughter      Heart disease Sibling        Social History     Socioeconomic History    Marital status:    Tobacco Use    Smoking status: Never     Passive exposure: Never    Smokeless tobacco: Never   Vaping Use    Vaping status: Never Used   Substance and Sexual Activity    Alcohol use: Yes     Comment: SOCIALLY    Drug use: Never     Social Determinants of Health     Food Insecurity: No Food Insecurity (5/24/2022)    Received from " [facility]    Hunger Vital Sign     Worried About Running Out of Food in the Last Year: Never true     Ran Out of Food in the Last Year: Never true        Review of Systems   Constitutional: denies any unintentional weight loss or change in strength.  Integumentary: denies any rashes or pruritus.  Eyes: denies any double vision or eye pain.  Ear/Nose/Mouth/Throat: denies any nosebleeds or gum bleeds.  Cardiovascular: denies any chest pain or syncope.  Respiratory: denies hemoptysis.  Gastrointestinal: denies nausea or vomiting.  Musculoskeletal: denies muscle cramping or weakness.  Neurologic: denies convulsions or seizures.  Hematologic/Lymphatic: denies bleeding tendencies.  Endocrine: denies heat/cold intolerance.  All other systems have been reviewed and are negative unless otherwise noted in the HPI.    OBJECTIVE:  There were no vitals taken for this visit.  Physical Exam   Constitutional: No obvious distress.  Eyes: Non-injected conjunctiva, sclera clear, EOMI.  Ears/Nose/Mouth/Throat: No obvious drainage per ears or nose.  Cardiovascular: Extremities are warm and well perfused. No edema, cyanosis or pallor.  Respiratory: No audible wheezing/stridor; respirations do not appear labored.  Gastrointestinal: Abdomen soft, not distended.  Musculoskeletal: Normal ROM of extremities.  Skin: No obvious rashes or open sores.  Neurologic: Alert and oriented, CN 2-12 grossly intact.  Psychiatric: Answers questions appropriately with normal affect.  Hematologic/Lymphatic/Immunologic: No obvious bruises or sites of spontaneous bleeding.  Genitourinary: No CVA tenderness, bladder not palpable.     Labs:  Lab Results   Component Value Date    WBC 4.7 03/21/2024    HGB 15.9 03/21/2024    HCT 48.2 03/21/2024     03/21/2024    CHOL 151 03/21/2024    TRIG 121 03/21/2024    HDL 64.8 03/21/2024    ALT 16 03/21/2024    AST 28 03/21/2024     03/21/2024    K 3.9 03/21/2024     03/21/2024     "CREATININE 1.28 03/21/2024    BUN 14 03/21/2024    CO2 29 03/21/2024    TSH 2.25 01/17/2024    PSA 1.68 01/16/2024    INR 1.1 08/23/2018    HGBA1C 5.2 03/21/2024     No results found for: \"KPSAT\", \"KPSAP\"  IMAGING:        PROCEDURES:    ASSESSMENT/PLAN:  Problem List Items Addressed This Visit    None     He has a [place] 3+4 equal 7 in the left mid lateral region, and several [place] 6 positive biopsies on the left.  His prostate was nodular on exam.  His PSA was 1.68 in January 2024.  MRI was positive for a PI-RADS 5 lesion on the left which identified [place] 7, which adjacent to the capsule, however extraprostatic extension was not identified.    He has erectile dysfunction treated with tadalafil.      We discussed treatment options for prostate cancer in detail.  He has elected for active surveillance at this time.  Other options included radiation or androgen deprivation.      He will schedule a PSMA scan.  His prostate biopsy will be sent for [place].  He will have a PSA drawn every 3 months.  He will follow up in 6 months.      All questions were answered to the patient’s satisfaction.  Patient agrees with the plan and wishes to proceed.  Follow-up will be scheduled appropriately.     Anthony Lang MD  "

## 2024-09-17 ENCOUNTER — APPOINTMENT (OUTPATIENT)
Age: 84
End: 2024-09-17
Payer: MEDICARE

## 2024-09-17 VITALS
SYSTOLIC BLOOD PRESSURE: 122 MMHG | HEART RATE: 62 BPM | BODY MASS INDEX: 28.81 KG/M2 | OXYGEN SATURATION: 97 % | WEIGHT: 184 LBS | DIASTOLIC BLOOD PRESSURE: 64 MMHG

## 2024-09-17 DIAGNOSIS — Z95.5 HISTORY OF CORONARY ANGIOPLASTY WITH INSERTION OF STENT: Primary | ICD-10-CM

## 2024-09-17 PROCEDURE — 3074F SYST BP LT 130 MM HG: CPT | Performed by: INTERNAL MEDICINE

## 2024-09-17 PROCEDURE — 1036F TOBACCO NON-USER: CPT | Performed by: INTERNAL MEDICINE

## 2024-09-17 PROCEDURE — 3078F DIAST BP <80 MM HG: CPT | Performed by: INTERNAL MEDICINE

## 2024-09-17 PROCEDURE — 99214 OFFICE O/P EST MOD 30 MIN: CPT | Performed by: INTERNAL MEDICINE

## 2024-09-17 PROCEDURE — 1157F ADVNC CARE PLAN IN RCRD: CPT | Performed by: INTERNAL MEDICINE

## 2024-09-17 ASSESSMENT — PATIENT HEALTH QUESTIONNAIRE - PHQ9
1. LITTLE INTEREST OR PLEASURE IN DOING THINGS: NOT AT ALL
2. FEELING DOWN, DEPRESSED OR HOPELESS: NOT AT ALL
SUM OF ALL RESPONSES TO PHQ9 QUESTIONS 1 AND 2: 0

## 2024-09-17 ASSESSMENT — ENCOUNTER SYMPTOMS
OCCASIONAL FEELINGS OF UNSTEADINESS: 0
DEPRESSION: 0
LOSS OF SENSATION IN FEET: 0

## 2024-09-17 NOTE — PROGRESS NOTES
Subjective      Chief Complaint   Patient presents with    Follow-up     6 month follow up        Remains angina free with no signs of volume overload    Had recent prostate biopsy which showed that one half of the prostate showed evidence of adenocarcinoma so he has genetic testing results pending to decide if he can just use a watchful waiting strategy versus additional intervention.      History of circumflex occlusion with posterior wall myocardial infarction in 2013 that was a small caliber vessel and 85% ramus stenosis and both were stented with bare-metal stents due to bone grafting that he required         Review of Systems   All other systems reviewed and are negative.       Objective   Physical Exam  Constitutional:       Appearance: Normal appearance.   HENT:      Head: Normocephalic and atraumatic.   Eyes:      Pupils: Pupils are equal, round, and reactive to light.   Cardiovascular:      Rate and Rhythm: Normal rate and regular rhythm.      Pulses: Normal pulses.      Heart sounds: Normal heart sounds.   Pulmonary:      Effort: Pulmonary effort is normal.      Breath sounds: Normal breath sounds.   Abdominal:      General: Abdomen is flat. Bowel sounds are normal.      Palpations: Abdomen is soft.   Musculoskeletal:         General: Normal range of motion.      Cervical back: Normal range of motion.   Skin:     General: Skin is warm and dry.   Neurological:      General: No focal deficit present.   Psychiatric:         Mood and Affect: Mood normal.         Judgment: Judgment normal.          Lab Review:   Not applicable    History of coronary artery stent placement  Stable on low-dose aspirin and moderate dose rosuvastatin

## 2024-09-26 ENCOUNTER — APPOINTMENT (OUTPATIENT)
Dept: UROLOGY | Facility: CLINIC | Age: 84
End: 2024-09-26
Payer: MEDICARE

## 2024-10-17 ENCOUNTER — APPOINTMENT (OUTPATIENT)
Dept: PRIMARY CARE | Facility: CLINIC | Age: 84
End: 2024-10-17
Payer: MEDICARE

## 2024-10-18 DIAGNOSIS — E78.00 PURE HYPERCHOLESTEROLEMIA: ICD-10-CM

## 2024-10-21 RX ORDER — EZETIMIBE 10 MG/1
10 TABLET ORAL DAILY
Qty: 90 TABLET | Refills: 3 | Status: SHIPPED | OUTPATIENT
Start: 2024-10-21

## 2024-10-21 RX ORDER — ROSUVASTATIN CALCIUM 10 MG/1
10 TABLET, COATED ORAL DAILY
Qty: 90 TABLET | Refills: 3 | Status: SHIPPED | OUTPATIENT
Start: 2024-10-21

## 2024-10-30 PROBLEM — D84.9 IMMUNODEFICIENCY, UNSPECIFIED: Status: ACTIVE | Noted: 2024-10-30

## 2024-10-30 PROBLEM — M79.18 MYOFASCIAL PAIN: Status: ACTIVE | Noted: 2024-09-26

## 2024-10-30 PROBLEM — J44.1 CHRONIC OBSTRUCTIVE PULMONARY DISEASE WITH (ACUTE) EXACERBATION (MULTI): Status: ACTIVE | Noted: 2024-10-30

## 2024-10-30 PROBLEM — M47.817 LUMBOSACRAL SPONDYLOSIS WITHOUT MYELOPATHY: Status: ACTIVE | Noted: 2024-09-26

## 2024-10-30 PROBLEM — I77.6 ARTERITIS, UNSPECIFIED (CMS-HCC): Status: ACTIVE | Noted: 2024-10-30

## 2024-10-30 PROBLEM — M48.062 SPINAL STENOSIS, LUMBAR REGION WITH NEUROGENIC CLAUDICATION: Status: ACTIVE | Noted: 2024-07-17

## 2024-10-30 PROBLEM — M54.59 LUMBAR FACET JOINT PAIN: Status: ACTIVE | Noted: 2024-09-26

## 2024-10-30 PROBLEM — I27.20 PULMONARY HYPERTENSION, UNSPECIFIED (MULTI): Status: ACTIVE | Noted: 2024-10-30

## 2024-10-30 PROBLEM — R29.898 WEAKNESS OF BACK: Status: ACTIVE | Noted: 2024-09-26

## 2024-10-30 RX ORDER — HYDROCODONE BITARTRATE AND ACETAMINOPHEN 5; 325 MG/1; MG/1
TABLET ORAL
COMMUNITY
Start: 2024-09-11 | End: 2024-11-04 | Stop reason: ALTCHOICE

## 2024-11-04 ENCOUNTER — OFFICE VISIT (OUTPATIENT)
Dept: PRIMARY CARE | Facility: CLINIC | Age: 84
End: 2024-11-04
Payer: MEDICARE

## 2024-11-04 VITALS
HEIGHT: 67 IN | SYSTOLIC BLOOD PRESSURE: 116 MMHG | BODY MASS INDEX: 28.88 KG/M2 | DIASTOLIC BLOOD PRESSURE: 61 MMHG | HEART RATE: 52 BPM | OXYGEN SATURATION: 94 % | TEMPERATURE: 97.8 F | WEIGHT: 184 LBS

## 2024-11-04 DIAGNOSIS — K21.9 GASTROESOPHAGEAL REFLUX DISEASE WITHOUT ESOPHAGITIS: ICD-10-CM

## 2024-11-04 DIAGNOSIS — C61 PROSTATE CANCER (MULTI): ICD-10-CM

## 2024-11-04 DIAGNOSIS — I10 ESSENTIAL HYPERTENSION: ICD-10-CM

## 2024-11-04 DIAGNOSIS — R73.9 ELEVATED BLOOD SUGAR: ICD-10-CM

## 2024-11-04 DIAGNOSIS — E78.00 PURE HYPERCHOLESTEROLEMIA: ICD-10-CM

## 2024-11-04 DIAGNOSIS — E55.9 VITAMIN D DEFICIENCY: ICD-10-CM

## 2024-11-04 DIAGNOSIS — M15.0 PRIMARY OSTEOARTHRITIS INVOLVING MULTIPLE JOINTS: ICD-10-CM

## 2024-11-04 DIAGNOSIS — G31.84 MCI (MILD COGNITIVE IMPAIRMENT): ICD-10-CM

## 2024-11-04 DIAGNOSIS — Z76.89 ENCOUNTER TO ESTABLISH CARE: Primary | ICD-10-CM

## 2024-11-04 DIAGNOSIS — E03.9 ACQUIRED HYPOTHYROIDISM: ICD-10-CM

## 2024-11-04 DIAGNOSIS — I25.10 ATHEROSCLEROSIS OF NATIVE CORONARY ARTERY OF NATIVE HEART WITHOUT ANGINA PECTORIS: ICD-10-CM

## 2024-11-04 PROCEDURE — 99214 OFFICE O/P EST MOD 30 MIN: CPT | Performed by: NURSE PRACTITIONER

## 2024-11-04 ASSESSMENT — ENCOUNTER SYMPTOMS
RESPIRATORY NEGATIVE: 1
CARDIOVASCULAR NEGATIVE: 1
CONSTITUTIONAL NEGATIVE: 1
LOSS OF SENSATION IN FEET: 0
DEPRESSION: 0
OCCASIONAL FEELINGS OF UNSTEADINESS: 0
GASTROINTESTINAL NEGATIVE: 1

## 2024-11-04 ASSESSMENT — PATIENT HEALTH QUESTIONNAIRE - PHQ9
2. FEELING DOWN, DEPRESSED OR HOPELESS: NOT AT ALL
1. LITTLE INTEREST OR PLEASURE IN DOING THINGS: NOT AT ALL
SUM OF ALL RESPONSES TO PHQ9 QUESTIONS 1 AND 2: 0

## 2024-11-04 ASSESSMENT — PAIN SCALES - GENERAL: PAINLEVEL_OUTOF10: 0-NO PAIN

## 2024-11-04 NOTE — ASSESSMENT & PLAN NOTE
Advised to schedule with neurology  Discussion about dementia causes and treatment optionss  Will follow up at next appt to see what neurology says

## 2024-11-04 NOTE — PROGRESS NOTES
[facility]: [facility]  PROGRESS NOTE      Boyd Johns is a 84 y.o. male that is presenting today for follow up and to establish care.  Former Dr. Bryant patient.  Last seen in April.   Recently diagnosed with prostate cancer.  Under active surveillance at this time.    The patient states he is doing well.  Was advised to see neurology by his rheumatologist d/t some forgetfulness.  States he cannot remember what he had for breakfast yesterday,  has some difficulty coming up with words at times.  Can remember things from years ago.  Does not have any issues with adls such as taking medications, wondering, forgetting where he parked etc.      Assessment/Plan   Assessment & Plan  Encounter to establish care    Orders:    Hemoglobin A1C; Future    Gastroesophageal reflux disease without esophagitis  Stable  No recent flares  Managed with omeprazole 20mg 12 tablet daily    Orders:    Comprehensive Metabolic Panel; Future    CBC; Future    Prostate cancer (Multi)  Sees Dr. De Leon  Follow up as scheduled  Will check psa every 3 months  Under active surveillance       Atherosclerosis of native coronary artery of native heart without angina pectoris  Managed by cardiology   Hx: stent to CX  and ramus 2013       Essential hypertension  Bp stable  Managed by cardiology  Continue current medications - hydrochlorothiazide 12.5daily    Acquired hypothyroidism  Last tsh 2.25 jan 2024  On no medications    Orders:    Thyroid Stimulating Hormone; Future    Primary osteoarthritis involving multiple joints  Sees pain management at [facility]  Last seen in July  No longer taking norco    Pure hypercholesterolemia  Managed by cardiology  Managed with zetia 10mg daily  Managed with rosuvastatin 10mg daily  Last lipid panel in July - normal    GPA  Sees rheumatology at [facility]  Receives infusions every 6 months  Due again in December  Vitamin D deficiency  Continue vitamin D 5000units daily  Orders:    Vitamin D  25-Hydroxy,Total (for eval of Vitamin D levels); Future    Elevated blood sugar    Orders:    Hemoglobin A1C; Future      MCI (mild cognitive impairment)  Advised to schedule with neurology  Discussion about dementia causes and treatment optionss  Will follow up at next appt to see what neurology says       Follow up for cpe in April with labs prior  Subjective   HPI  Review of Systems   Constitutional: Negative.    Respiratory: Negative.     Cardiovascular: Negative.    Gastrointestinal: Negative.    Neurological:         Some forgetfulness  See hpi for details      Objective   There were no vitals filed for this visit.   There is no height or weight on file to calculate BMI.  Physical Exam  Vitals and nursing note reviewed.   Constitutional:       Appearance: Normal appearance.   Cardiovascular:      Rate and Rhythm: Normal rate and regular rhythm.      Pulses: Normal pulses.      Heart sounds: Normal heart sounds.   Pulmonary:      Effort: Pulmonary effort is normal.      Breath sounds: Normal breath sounds.   Abdominal:      General: Bowel sounds are normal.      Palpations: Abdomen is soft.   Skin:     General: Skin is warm and dry.   Neurological:      Mental Status: He is alert and oriented to person, place, and time.   Psychiatric:         Mood and Affect: Mood normal.         Behavior: Behavior normal.       Vitals:    11/04/24 0939   BP: 116/61   Pulse: 52   Temp: 36.6 °C (97.8 °F)   SpO2: 94%       Diagnostic Results   Lab Results   Component Value Date    GLUCOSE 96 03/21/2024    CALCIUM 9.8 03/21/2024     03/21/2024    K 3.9 03/21/2024    CO2 29 03/21/2024     03/21/2024    BUN 14 03/21/2024    CREATININE 1.28 03/21/2024     Lab Results   Component Value Date    ALT 16 03/21/2024    AST 28 03/21/2024    ALKPHOS 55 03/21/2024    BILITOT 1.2 03/21/2024     Lab Results   Component Value Date    WBC 4.7 03/21/2024    HGB 15.9 03/21/2024    HCT 48.2 03/21/2024     03/21/2024      "03/21/2024     Lab Results   Component Value Date    CHOL 151 03/21/2024    CHOL 139 04/26/2023    CHOL 141 01/19/2023     Lab Results   Component Value Date    HDL 64.8 03/21/2024    HDL 59 04/26/2023    HDL 49 01/19/2023     Lab Results   Component Value Date    LDLCALC 62 03/21/2024    LDLCALC 62 (L) 04/26/2023    LDLCALC 74 01/19/2023     Lab Results   Component Value Date    TRIG 121 03/21/2024    TRIG 88 04/26/2023    TRIG 89 01/19/2023     No components found for: \"CHOLHDL\"  Lab Results   Component Value Date    HGBA1C 5.2 03/21/2024     Other labs not included in the list above were reviewed either before or during this encounter.    History    Past Medical History:   Diagnosis Date    Atherosclerosis of native coronary artery of native heart without angina pectoris 09/06/2023    Benign prostatic hyperplasia 09/06/2023 1/24 PSA 1.68    CKD (chronic kidney disease)     Stage 3a    Constipation      colonoscopy OK last 4 years, TSH nl 1/24, Miralax.    Degenerative joint disease 09/06/2023    Gastroesophageal reflux disease 09/06/2023    Granulomatosis with polyangiitis (Multi) 09/06/2023     rheumatology [facility]     History of pancreatitis 11/07/2023     work up neg.    HL (hearing loss)     Hyperlipidemia     Hypertension     Impaired fasting glucose 09/06/2023    Myocardial infarction (Multi) 2013    PVCs (premature ventricular contractions) 09/06/2023    Vitamin D deficiency 09/06/2023     Past Surgical History:   Procedure Laterality Date    CHOLECYSTECTOMY      CORONARY STENT PLACEMENT  2013    X 2     Family History   Problem Relation Name Age of Onset    Pancreatic cancer Mother      Alzheimer's disease Father      Mental illness Father      Cancer Daughter      Heart disease Sibling       Social History     Socioeconomic History    Marital status:      Spouse name: Not on file    Number of children: Not on file    Years of education: Not on file    Highest " education level: Not on file   Occupational History    Not on file   Tobacco Use    Smoking status: Never     Passive exposure: Never    Smokeless tobacco: Never   Vaping Use    Vaping status: Never Used   Substance and Sexual Activity    Alcohol use: Yes     Comment: SOCIALLY    Drug use: Never    Sexual activity: Not on file   Other Topics Concern    Not on file   Social History Narrative    Not on file     Social Drivers of Health     Financial Resource Strain: Not on file   Food Insecurity: No Food Insecurity (5/24/2022)    [facility]    Hunger Vital Sign     Worried About Running Out of Food in the Last Year: Never true     Ran Out of Food in the Last Year: Never true   Transportation Needs: Not on file   Physical Activity: Not on file   Stress: Not on file   Social Connections: Not on file   Intimate Partner Violence: Not on file   Housing Stability: Not on file     No Known Allergies  Current Outpatient Medications on File Prior to Visit   Medication Sig Dispense Refill    HYDROcodone-acetaminophen (Norco) 5-325 mg tablet take 1 tablet by oral route every 4 hours as needed for pain      acetaminophen (Tylenol) 325 mg tablet Take by mouth every 6 hours if needed for mild pain (1 - 3).      amoxicillin (Amoxil) 500 mg capsule Take 1 capsule (500 mg) by mouth 3 times a day.      ascorbic acid, vitamin C, 1,000 mg ER tablet Take 1 tablet (1,000 mg) by mouth once daily.      aspirin 81 mg EC tablet 1 tablet Orally Once a day      cholecalciferol (Vitamin D-3) 5,000 Units tablet       ezetimibe (Zetia) 10 mg tablet TAKE 1 TABLET BY MOUTH ONCE  DAILY 90 tablet 3    folic acid (Folvite) 400 mcg tablet 1 tablet (0.4 mg).      GLUCOSAMINE SULFATE ORAL       hydroCHLOROthiazide (Microzide) 12.5 mg capsule Take 1 capsule (12.5 mg) by mouth once daily.      omeprazole OTC (PriLOSEC OTC) 20 mg EC tablet 1/2 tablet by mouth daily      polyethylene glycol (Glycolax, Miralax) 17 gram  packet Take 17 g by mouth once daily.      rosuvastatin (Crestor) 10 mg tablet TAKE 1 TABLET BY MOUTH ONCE  DAILY 90 tablet 3    sulfamethoxazole-trimethoprim (Bactrim DS) 800-160 mg tablet Take 1 tablet by mouth 2 times a day. 6 tablet 0    tadalafil (Cialis) 20 mg tablet Take 1 tablet (20 mg) by mouth once daily as needed for erectile dysfunction. 100 tablet 2    ubidecarenone (CO Q-10 ORAL) Take by mouth.      VITAMIN B COMPLEX ORAL 1000mcg Orally Daily      vitamin E 180 mg (400 unit) capsule 1 capsule Orally Once a day      ZINC ORAL Zinc 500 mg - 1 tablet Orally once a day      [DISCONTINUED] calcium carbonate (CALCIUM 600 ORAL) Take by mouth.      [DISCONTINUED] potassium chloride CR 20 mEq ER tablet Take 1 tablet (20 mEq) by mouth once a day on Monday, Wednesday, and Friday.       No current facility-administered medications on file prior to visit.     Immunization History   Administered Date(s) Administered    DTaP vaccine, pediatric  (INFANRIX) 04/18/2012    Flu vaccine, quadrivalent, high-dose, preservative free, age 65y+ (FLUZONE) 11/19/2021, 10/10/2022, 10/10/2023    Flu vaccine, trivalent, preservative free, HIGH-DOSE, age 65y+ (Fluzone) 12/13/2016, 12/13/2018, 12/04/2019    Influenza, Seasonal, Quadrivalent, Adjuvanted 10/02/2020    Influenza, seasonal, injectable 11/03/2010, 10/11/2012, 12/16/2015    Moderna SARS-CoV-2 Vaccination 02/05/2021, 03/05/2021, 11/19/2021    Pfizer COVID-19 vaccine, 12 years and older, (30mcg/0.3mL) (Comirnaty) 11/29/2023    Pfizer COVID-19 vaccine, bivalent, age 12 years and older (30 mcg/0.3 mL) 11/26/2022    Pfizer Gray Cap SARS-CoV-2 06/01/2022    Pneumococcal conjugate vaccine, 13-valent (PREVNAR 13) 12/16/2015    Pneumococcal conjugate vaccine, 20-valent (PREVNAR 20) 05/04/2023    Pneumococcal polysaccharide vaccine, 23-valent, age 2 years and older (PNEUMOVAX 23) 04/01/2007, 12/13/2016    RSV, 60 Years And Older (AREXVY) 09/13/2023    Tdap vaccine, age 7 year and  older (BOOSTRIX, ADACEL) 05/31/2022    Zoster vaccine, recombinant, adult (SHINGRIX) 05/31/2022, 10/26/2022    Zoster, live 03/31/2016     Patient's medical history was reviewed and updated either before or during this encounter.       Kaitlin Cadena, APRN-CNP

## 2024-11-04 NOTE — ASSESSMENT & PLAN NOTE
Sees Dr. De Leon  Follow up as scheduled  Will check psa every 3 months  Under active surveillance

## 2024-11-04 NOTE — ASSESSMENT & PLAN NOTE
Stable  No recent flares  Managed with omeprazole 20mg 12 tablet daily    Orders:    Comprehensive Metabolic Panel; Future    CBC; Future

## 2024-11-04 NOTE — ASSESSMENT & PLAN NOTE
Continue vitamin D 5000units daily  Orders:    Vitamin D 25-Hydroxy,Total (for eval of Vitamin D levels); Future

## 2024-12-02 ENCOUNTER — LAB (OUTPATIENT)
Dept: LAB | Facility: LAB | Age: 84
End: 2024-12-02
Payer: MEDICARE

## 2024-12-02 DIAGNOSIS — C61 PROSTATE CANCER (MULTI): ICD-10-CM

## 2024-12-02 LAB — PSA SERPL-MCNC: 1.55 NG/ML

## 2024-12-02 PROCEDURE — 36415 COLL VENOUS BLD VENIPUNCTURE: CPT

## 2024-12-02 PROCEDURE — 84153 ASSAY OF PSA TOTAL: CPT

## 2025-03-04 LAB — PSA SERPL-MCNC: 1.57 NG/ML

## 2025-03-15 NOTE — PROGRESS NOTES
Patient is a 85 y.o. male presenting for follow up of [place] 7 prostate cancer, and erectile dysfunction.    SUBJECTIVE:  HPI   He presents for followup.  Prostate biopsy identified a Mariella 3+4 equal 7 in the left mid lateral region, and several Mariella 6 positive biopsies on the left.  MRI was positive for a PI-RADS 5 lesion on the left which identified Mariella 7, which adjacent to the capsule, however extraprostatic extension was not identified.    He has new diagnosis of mild dementia.    He uses tadalafil 20 mg PRN for erectile dysfunction with good effect. He requests a renewal today.     Past Medical History:   Diagnosis Date    Atherosclerosis of native coronary artery of native heart without angina pectoris 09/06/2023    Benign prostatic hyperplasia 09/06/2023 1/24 PSA 1.68    CKD (chronic kidney disease)     Stage 3a    Constipation      colonoscopy OK last 4 years, TSH nl 1/24, Miralax.    Degenerative joint disease 09/06/2023    Gastroesophageal reflux disease 09/06/2023    Granulomatosis with polyangiitis (Multi) 09/06/2023     rheumatology [facility]     History of pancreatitis 11/07/2023     work up neg.    HL (hearing loss)     Hyperlipidemia     Hypertension     Impaired fasting glucose 09/06/2023    Myocardial infarction (Multi) 2013    PVCs (premature ventricular contractions) 09/06/2023    Vitamin D deficiency 09/06/2023     Past Surgical History:   Procedure Laterality Date    CHOLECYSTECTOMY      CORONARY STENT PLACEMENT  2013    X 2      Family History   Problem Relation Name Age of Onset    Pancreatic cancer Mother      Alzheimer's disease Father      Mental illness Father      Cancer Daughter      Heart disease Sibling        Tobacco Use: Low Risk  (3/17/2025)    Patient History     Smoking Tobacco Use: Never     Smokeless Tobacco Use: Never     Passive Exposure: Never     Review of Systems     OBJECTIVE:  There were no vitals taken for this  "visit.  Physical Exam   Constitutional: No obvious distress.  Eyes: Non-injected conjunctiva, sclera clear, EOMI.  Ears/Nose/Mouth/Throat: No obvious drainage per ears or nose.  Cardiovascular: Extremities are warm and well perfused. No edema, cyanosis or pallor.  Respiratory: No audible wheezing/stridor; respirations do not appear labored.  Gastrointestinal: Abdomen soft, not distended.  Musculoskeletal: Normal ROM of extremities.  Skin: No obvious rashes or open sores.  Neurologic: Alert and oriented, CN 2-12 grossly intact.  Psychiatric: Answers questions appropriately with normal affect.  Hematologic/Lymphatic/Immunologic: No obvious bruises or sites of spontaneous bleeding.  Genitourinary: No CVA tenderness, bladder not palpable. Prostate is mildly firm.    LABS:  Lab Results   Component Value Date    GLUCOSE 96 03/21/2024    CALCIUM 9.8 03/21/2024     03/21/2024    K 3.9 03/21/2024    CO2 29 03/21/2024     03/21/2024    BUN 14 03/21/2024    CREATININE 1.28 03/21/2024     Lab Results   Component Value Date    PSA 1.57 03/03/2025    PSA 1.55 12/02/2024    PSA 1.68 01/16/2024     No results found for: \"URINECULTURE\"   IMAGING:      SURGICAL PATHOLOGY:  FINAL DIAGNOSIS   A. PROSTATE, BIOPSY, RIGHT MEDIAL MID:   -- Prostatic tissue with no evidence of malignancy.     B. PROSTATE, BIOPSY, RIGHT MEDIAL BASE:   -- Prostatic tissue with no evidence of malignancy.     C. PROSTATE, BIOPSY, RIGHT MEDIAL APEX:   -- Prostatic tissue with no evidence of malignancy.     D. PROSTATE, BIOPSY, RIGHT LATERAL MID:   -- Prostatic tissue with no evidence of malignancy.     E. PROSTATE, BIOPSY, RIGHT LATERAL APEX:   -- Prostatic tissue with no evidence of malignancy.     F. PROSTATE, BIOPSY, RIGHT LATERAL BASE:   -- Prostatic tissue with no evidence of malignancy.     G. PROSTATE, BIOPSY, TARGETED ELADIA, AREA OF INTEREST 1- LEFT MID LATERAL:   -- Prostatic adenocarcinoma, Mariella score 3+4=7, Grade Group 2, involving 8 of 8 " cores and fragments and approximately 80% of the overall specimen. See note.     Note: Mariella pattern 4 comprises 10% of the total tumor volume and is composed of fused and cribriform glands.      H. PROSTATE, BIOPSY, LEFT LATERAL APEX:   -- Prostatic adenocarcinoma, [place] score 3+3=6, Grade Group 1, involving 1 of 1 fragmented core and approximately 40% of the overall specimen.      I. PROSTATE, BIOPSY, LEFT MEDIAL MID:   -- Prostatic adenocarcinoma, Mariella score 3+3=6, Grade Group 1, involving 1 of 1 core and approximately 5% of the overall specimen.      J. PROSTATE, BIOPSY, LEFT LATERAL BASE:   -- Prostatic adenocarcinoma, Beacon score 3+3=6, Grade Group 1, involving 1 of 1 core and approximately 10% of the overall specimen.   -- Perineural invasion present.     K. PROSTATE, BIOPSY, LEFT LATERAL MID:   -- Prostatic adenocarcinoma, Mariella score 3+4=7, Grade Group 2, involving 1 of 1 core and approximately 90% of the overall specimen. See note.     Note: Mariella pattern 4 comprises 25% of the total tumor volume and is composed of cribriform glands.      L. PROSTATE, BIOPSY, LEFT MEDIAL APEX:   -- Minute focus of prostatic adenocarcinoma, Beacon score 3+3=6, Grade Group 1, involving 1 of 1 fragmented core and approximately 5% of the overall specimen.      M. PROSTATE, BIOPSY, LEFT MEDIAL BASE:      -- -- Prostatic and seminal vesicle/ejaculatory duct tissue with no evidence of malignancy. See note.     Note: PIN4 immunostain cocktail (including p63, [id number] and AMACR) shows preserved basal cells, confirming the above diagnosis.       : Dr. Leslie Maharaj.      Electronically signed by Ana Dhillon MD on 9/11/2024 at 1404     PROCEDURES:  PVR 85 mL   3/17/25    ASSESSMENT/PLAN:  Problem List Items Addressed This Visit       Erectile dysfunction due to diseases classified elsewhere - Primary    Prostate cancer (Multi)     Other Visit Diagnoses       Urinary symptom or sign        Relevant  Orders    Measure post void residual (Completed)    POCT UA Automated manually resulted (Completed)           He has a Mariella 3+4 equal 7 in the left mid lateral region, and several [place] 6 positive biopsies on the left.  His prostate was nodular on exam.  His PSA was 1.68 in January 2024.  MRI was positive for a PI-RADS 5 lesion on the left which identified Mariella 7, which adjacent to the capsule, however extraprostatic extension was not identified.    We discussed treatment options for prostate cancer in detail.  He has elected for active surveillance at this time.  Other options included radiation or androgen deprivation.      Decipher pending.  We discussed options pending.  He will follow up in 4 months with repeat PSA.    PSA summary  03/03/25 1.57  12/02/24 1.55  01/16/24 1.68  04/26/23 1.1  06/27/22 1.2    He has erectile dysfunction, treated successfully with tadalafil 20 mg. He was provided with a renewal today.     POCT UA had trace blood today (3/17/25) and will be sent for microscopy, culture, and sensitivity.     All questions were answered to the patient’s satisfaction.  Patient agrees with the plan and wishes to proceed.  Follow-up will be scheduled appropriately.     Scribe Attestation  By signing my name below, I, Randy Kemp,   attest that this documentation has been prepared under the direction and in the presence of Anthony Lang MD.

## 2025-03-17 ENCOUNTER — APPOINTMENT (OUTPATIENT)
Dept: UROLOGY | Facility: CLINIC | Age: 85
End: 2025-03-17
Payer: MEDICARE

## 2025-03-17 DIAGNOSIS — R82.90 ABNORMAL FINDING ON URINALYSIS: ICD-10-CM

## 2025-03-17 DIAGNOSIS — R39.9 URINARY SYMPTOM OR SIGN: ICD-10-CM

## 2025-03-17 DIAGNOSIS — R31.29 MICROSCOPIC HEMATURIA: ICD-10-CM

## 2025-03-17 DIAGNOSIS — C61 PROSTATE CANCER (MULTI): ICD-10-CM

## 2025-03-17 DIAGNOSIS — N52.1 ERECTILE DYSFUNCTION DUE TO DISEASES CLASSIFIED ELSEWHERE: Primary | ICD-10-CM

## 2025-03-17 LAB
POC APPEARANCE, URINE: CLEAR
POC BILIRUBIN, URINE: NEGATIVE
POC BLOOD, URINE: ABNORMAL
POC COLOR, URINE: YELLOW
POC GLUCOSE, URINE: NEGATIVE MG/DL
POC KETONES, URINE: NEGATIVE MG/DL
POC LEUKOCYTES, URINE: NEGATIVE
POC NITRITE,URINE: NEGATIVE
POC PH, URINE: 6 PH
POC PROTEIN, URINE: NEGATIVE MG/DL
POC SPECIFIC GRAVITY, URINE: 1.01
POC UROBILINOGEN, URINE: 0.2 EU/DL

## 2025-03-17 PROCEDURE — 99214 OFFICE O/P EST MOD 30 MIN: CPT | Performed by: UROLOGY

## 2025-03-17 PROCEDURE — 81003 URINALYSIS AUTO W/O SCOPE: CPT | Performed by: UROLOGY

## 2025-03-17 PROCEDURE — 1036F TOBACCO NON-USER: CPT | Performed by: UROLOGY

## 2025-03-17 PROCEDURE — G2211 COMPLEX E/M VISIT ADD ON: HCPCS | Performed by: UROLOGY

## 2025-03-17 PROCEDURE — 1160F RVW MEDS BY RX/DR IN RCRD: CPT | Performed by: UROLOGY

## 2025-03-17 PROCEDURE — 1157F ADVNC CARE PLAN IN RCRD: CPT | Performed by: UROLOGY

## 2025-03-17 PROCEDURE — 1159F MED LIST DOCD IN RCRD: CPT | Performed by: UROLOGY

## 2025-03-17 PROCEDURE — 1126F AMNT PAIN NOTED NONE PRSNT: CPT | Performed by: UROLOGY

## 2025-03-17 RX ORDER — TADALAFIL 20 MG/1
20 TABLET ORAL DAILY PRN
Qty: 100 TABLET | Refills: 2 | Status: SHIPPED | OUTPATIENT
Start: 2025-03-17 | End: 2026-01-11

## 2025-03-17 ASSESSMENT — PAIN SCALES - GENERAL: PAINLEVEL_OUTOF10: 0-NO PAIN

## 2025-03-18 ENCOUNTER — APPOINTMENT (OUTPATIENT)
Age: 85
End: 2025-03-18
Payer: MEDICARE

## 2025-03-18 VITALS
OXYGEN SATURATION: 95 % | HEIGHT: 67 IN | DIASTOLIC BLOOD PRESSURE: 70 MMHG | WEIGHT: 186 LBS | HEART RATE: 64 BPM | BODY MASS INDEX: 29.19 KG/M2 | RESPIRATION RATE: 16 BRPM | SYSTOLIC BLOOD PRESSURE: 130 MMHG

## 2025-03-18 DIAGNOSIS — Z95.5 HISTORY OF CORONARY ANGIOPLASTY WITH INSERTION OF STENT: Primary | ICD-10-CM

## 2025-03-18 PROCEDURE — 1126F AMNT PAIN NOTED NONE PRSNT: CPT | Performed by: INTERNAL MEDICINE

## 2025-03-18 PROCEDURE — 1036F TOBACCO NON-USER: CPT | Performed by: INTERNAL MEDICINE

## 2025-03-18 PROCEDURE — 3075F SYST BP GE 130 - 139MM HG: CPT | Performed by: INTERNAL MEDICINE

## 2025-03-18 PROCEDURE — 1157F ADVNC CARE PLAN IN RCRD: CPT | Performed by: INTERNAL MEDICINE

## 2025-03-18 PROCEDURE — 3078F DIAST BP <80 MM HG: CPT | Performed by: INTERNAL MEDICINE

## 2025-03-18 PROCEDURE — 1159F MED LIST DOCD IN RCRD: CPT | Performed by: INTERNAL MEDICINE

## 2025-03-18 PROCEDURE — 99214 OFFICE O/P EST MOD 30 MIN: CPT | Performed by: INTERNAL MEDICINE

## 2025-03-18 ASSESSMENT — ENCOUNTER SYMPTOMS
LOSS OF SENSATION IN FEET: 0
OCCASIONAL FEELINGS OF UNSTEADINESS: 0
DEPRESSION: 0

## 2025-03-18 ASSESSMENT — PAIN SCALES - GENERAL: PAINLEVEL_OUTOF10: 0-NO PAIN

## 2025-03-18 ASSESSMENT — LIFESTYLE VARIABLES
HAS A RELATIVE, FRIEND, DOCTOR, OR ANOTHER HEALTH PROFESSIONAL EXPRESSED CONCERN ABOUT YOUR DRINKING OR SUGGESTED YOU CUT DOWN: NO
HOW OFTEN DO YOU HAVE SIX OR MORE DRINKS ON ONE OCCASION: NEVER
AUDIT TOTAL SCORE: 1
AUDIT-C TOTAL SCORE: 1
HAVE YOU OR SOMEONE ELSE BEEN INJURED AS A RESULT OF YOUR DRINKING: NO
SKIP TO QUESTIONS 9-10: 1
HOW MANY STANDARD DRINKS CONTAINING ALCOHOL DO YOU HAVE ON A TYPICAL DAY: 1 OR 2
HOW OFTEN DO YOU HAVE A DRINK CONTAINING ALCOHOL: MONTHLY OR LESS

## 2025-03-18 NOTE — PROGRESS NOTES
Subjective      Chief Complaint   Patient presents with    Follow-up     6 month Follow up        Had  prostate biopsy which showed that one half of the prostate showed evidence of adenocarcinoma so he has genetic testing results pending to decide if he can just use a watchful waiting strategy versus additional intervention.        History of circumflex occlusion with posterior wall myocardial infarction in 2013 that was a small caliber vessel and 85% ramus stenosis and both were stented with bare-metal stents due to bone grafting that he required           Review of Systems   All other systems reviewed and are negative.       Objective   Physical Exam  Constitutional:       Appearance: Normal appearance.   HENT:      Head: Normocephalic and atraumatic.   Eyes:      Pupils: Pupils are equal, round, and reactive to light.   Cardiovascular:      Rate and Rhythm: Normal rate and regular rhythm.      Pulses: Normal pulses.      Heart sounds: Normal heart sounds.   Pulmonary:      Effort: Pulmonary effort is normal.      Breath sounds: Normal breath sounds.   Abdominal:      General: Abdomen is flat. Bowel sounds are normal.      Palpations: Abdomen is soft.   Musculoskeletal:         General: Normal range of motion.      Cervical back: Normal range of motion.   Skin:     General: Skin is warm and dry.   Neurological:      General: No focal deficit present.   Psychiatric:         Mood and Affect: Mood normal.         Judgment: Judgment normal.          Lab Review:   None applicable    History of coronary artery stent placement  Remains angina free on low-dose aspirin therapy and combination of rosuvastatin and Zetia for cardiac risk reduction.    Pure hypercholesterolemia  Remains on Zetia plus moderate dose rosuvastatin with annual lipid and metabolic profiles    Essential hypertension  Well-managed on low-dose diuretic and he does take tadalafil

## 2025-03-18 NOTE — ASSESSMENT & PLAN NOTE
Remains angina free on low-dose aspirin therapy and combination of rosuvastatin and Zetia for cardiac risk reduction.

## 2025-03-19 LAB
BACTERIA #/AREA URNS HPF: NORMAL /HPF
BACTERIA UR CULT: NORMAL
HYALINE CASTS #/AREA URNS LPF: NORMAL /LPF
RBC #/AREA URNS HPF: NORMAL /HPF
SERVICE CMNT-IMP: NORMAL
SQUAMOUS #/AREA URNS HPF: NORMAL /HPF
WBC #/AREA URNS HPF: NORMAL /HPF

## 2025-03-27 ENCOUNTER — TELEPHONE (OUTPATIENT)
Dept: PRIMARY CARE | Facility: CLINIC | Age: 85
End: 2025-03-27
Payer: MEDICARE

## 2025-03-29 LAB
25(OH)D3+25(OH)D2 SERPL-MCNC: 68 NG/ML (ref 30–100)
ALBUMIN SERPL-MCNC: 4.5 G/DL (ref 3.6–5.1)
ALP SERPL-CCNC: 54 U/L (ref 35–144)
ALT SERPL-CCNC: 14 U/L (ref 9–46)
ANION GAP SERPL CALCULATED.4IONS-SCNC: 7 MMOL/L (CALC) (ref 7–17)
AST SERPL-CCNC: 24 U/L (ref 10–35)
BILIRUB SERPL-MCNC: 1 MG/DL (ref 0.2–1.2)
BUN SERPL-MCNC: 17 MG/DL (ref 7–25)
CALCIUM SERPL-MCNC: 9.9 MG/DL (ref 8.6–10.3)
CHLORIDE SERPL-SCNC: 102 MMOL/L (ref 98–110)
CHOLEST SERPL-MCNC: 135 MG/DL
CHOLEST/HDLC SERPL: 2.4 (CALC)
CO2 SERPL-SCNC: 31 MMOL/L (ref 20–32)
CREAT SERPL-MCNC: 1.2 MG/DL (ref 0.7–1.22)
EGFRCR SERPLBLD CKD-EPI 2021: 60 ML/MIN/1.73M2
ERYTHROCYTE [DISTWIDTH] IN BLOOD BY AUTOMATED COUNT: 12.1 % (ref 11–15)
EST. AVERAGE GLUCOSE BLD GHB EST-MCNC: 114 MG/DL
EST. AVERAGE GLUCOSE BLD GHB EST-SCNC: 6.3 MMOL/L
GLUCOSE SERPL-MCNC: 114 MG/DL (ref 65–99)
HBA1C MFR BLD: 5.6 % OF TOTAL HGB
HCT VFR BLD AUTO: 49.7 % (ref 38.5–50)
HDLC SERPL-MCNC: 56 MG/DL
HGB BLD-MCNC: 16.8 G/DL (ref 13.2–17.1)
LDLC SERPL CALC-MCNC: 65 MG/DL (CALC)
MCH RBC QN AUTO: 33 PG (ref 27–33)
MCHC RBC AUTO-ENTMCNC: 33.8 G/DL (ref 32–36)
MCV RBC AUTO: 97.6 FL (ref 80–100)
NONHDLC SERPL-MCNC: 79 MG/DL (CALC)
PLATELET # BLD AUTO: 233 THOUSAND/UL (ref 140–400)
PMV BLD REES-ECKER: 9.6 FL (ref 7.5–12.5)
POTASSIUM SERPL-SCNC: 4 MMOL/L (ref 3.5–5.3)
PROT SERPL-MCNC: 6.6 G/DL (ref 6.1–8.1)
RBC # BLD AUTO: 5.09 MILLION/UL (ref 4.2–5.8)
SODIUM SERPL-SCNC: 140 MMOL/L (ref 135–146)
TRIGL SERPL-MCNC: 65 MG/DL
TSH SERPL-ACNC: 1.63 MIU/L (ref 0.4–4.5)
WBC # BLD AUTO: 3.1 THOUSAND/UL (ref 3.8–10.8)

## 2025-04-04 ENCOUNTER — APPOINTMENT (OUTPATIENT)
Dept: PRIMARY CARE | Facility: CLINIC | Age: 85
End: 2025-04-04
Payer: MEDICARE

## 2025-04-16 NOTE — ASSESSMENT & PLAN NOTE
Followed by cardiology   Continue aspirn 81mg daily  Continue zetia 10mg daily  Continue crestor 10mg daily

## 2025-04-16 NOTE — PROGRESS NOTES
[facility]: [facility]  MEDICARE WELLNESS EXAM      Boyd Johns is a 85 y.o. male that is presenting today for annual medicare wellness exam and management of medical conditions.  Recently saw neurology @ [facility] started on aricept 5mg daily     Assessment/Plan    Assessment & Plan  Encounter for Medicare annual wellness exam  Medications and problem list reconciled today       Atherosclerosis of native coronary artery of native heart without angina pectoris  Followed by cardiology   Continue aspirn 81mg daily  Continue zetia 10mg daily  Continue crestor 10mg daily       Essential hypertension  Bp stable   Continue hydrochlorothiazide 12.5mg daily  Orders:    Comprehensive Metabolic Panel    CBC; Future    Impaired fasting glucose  Normal A1c 5.6 on 3/28/25       Vitamin D deficiency  Continue current dose of vitamin d3       Acquired hypothyroidism  Normal tsh  On no medication       Gastroesophageal reflux disease without esophagitis  Stable no flares  Continue omeprazole 20mg daily       Benign prostatic hyperplasia, unspecified whether lower urinary tract symptoms present  Followed by urology Dr. Lang  Continue bactrim ds 1 tablet 3 times a week       Stage 3a chronic kidney disease (Multi)  Kidney function stable   Bun 17, creat 1.20, GFR 60 - 3/28/25       Prostate cancer (Multi)  Followed by Dr. Lang urology       MCI (mild cognitive impairment)  Managed by neurology at [facility]  On aricept 5mg daily will increase to 10mg if he can tolerate 5mg         ADVANCED CARE PLANNING  Advanced Care Planning was discussed with patient:  The patient has an active advanced care plan on file. The patient has an active surrogate decision-maker on file.      ACTIVITIES OF DAILY LIVING  Basic ADLs:  Bathing: Independent, Dressing: Independent, Toileting: Independent, Transferring: Independent, Continence: Independent, Feeding: Independent.    Instrumental ADLs:  Ability to use phone: Independent,  Shopping: Independent, Cooking: Independent, House-keeping: Independent, Laundry: Independent, Transportation: Independent, Medication Management: Independent, Finance Management: Independent.    Subjective   HPI  Review of Systems   Constitutional: Negative.    HENT: Negative.     Eyes: Negative.    Respiratory: Negative.     Cardiovascular: Negative.    Gastrointestinal: Negative.    Endocrine: Negative.    Genitourinary: Negative.    Musculoskeletal: Negative.    Skin: Negative.    Allergic/Immunologic: Negative.    Neurological:         Mild cognitive impairment   Psychiatric/Behavioral: Negative.       Objective   There were no vitals filed for this visit.   There is no height or weight on file to calculate BMI.  Physical Exam  Vitals reviewed.   Constitutional:       Appearance: Normal appearance.   HENT:      Head: Normocephalic and atraumatic.      Nose: Nose normal.      Mouth/Throat:      Mouth: Mucous membranes are moist.      Pharynx: Oropharynx is clear.   Eyes:      Extraocular Movements: Extraocular movements intact.      Pupils: Pupils are equal, round, and reactive to light.   Cardiovascular:      Rate and Rhythm: Normal rate and regular rhythm.      Pulses: Normal pulses.      Heart sounds: Normal heart sounds.   Pulmonary:      Effort: Pulmonary effort is normal.      Breath sounds: Normal breath sounds.   Abdominal:      General: Bowel sounds are normal.   Musculoskeletal:         General: Normal range of motion.      Cervical back: Normal range of motion and neck supple.   Skin:     General: Skin is warm and dry.      Capillary Refill: Capillary refill takes less than 2 seconds.   Neurological:      General: No focal deficit present.      Mental Status: He is alert and oriented to person, place, and time.   Psychiatric:         Mood and Affect: Mood normal.         Behavior: Behavior normal.         Thought Content: Thought content normal.         Judgment: Judgment normal.       Diagnostic  "Results   Lab Results   Component Value Date    GLUCOSE 114 (H) 03/28/2025    CALCIUM 9.9 03/28/2025     03/28/2025    K 4.0 03/28/2025    CO2 31 03/28/2025     03/28/2025    BUN 17 03/28/2025    CREATININE 1.20 03/28/2025     Lab Results   Component Value Date    ALT 14 03/28/2025    AST 24 03/28/2025    ALKPHOS 54 03/28/2025    BILITOT 1.0 03/28/2025     Lab Results   Component Value Date    WBC 3.1 (L) 03/28/2025    HGB 16.8 03/28/2025    HCT 49.7 03/28/2025    MCV 97.6 03/28/2025     03/28/2025     Lab Results   Component Value Date    CHOL 135 03/28/2025    CHOL 151 03/21/2024    CHOL 139 04/26/2023     Lab Results   Component Value Date    HDL 56 03/28/2025    HDL 64.8 03/21/2024    HDL 59 04/26/2023     Lab Results   Component Value Date    LDLCALC 65 03/28/2025    LDLCALC 62 03/21/2024    LDLCALC 62 (L) 04/26/2023     Lab Results   Component Value Date    TRIG 65 03/28/2025    TRIG 121 03/21/2024    TRIG 88 04/26/2023     No components found for: \"CHOLHDL\"  Lab Results   Component Value Date    HGBA1C 5.6 03/28/2025     Other labs not included in the list above reviewed either before or during this encounter.    History   Medical History[1]  Surgical History[2]  Family History[3]  Social History     Socioeconomic History    Marital status:      Spouse name: Not on file    Number of children: Not on file    Years of education: Not on file    Highest education level: Not on file   Occupational History    Not on file   Tobacco Use    Smoking status: Never     Passive exposure: Never    Smokeless tobacco: Never   Vaping Use    Vaping status: Never Used   Substance and Sexual Activity    Alcohol use: Yes     Comment: SOCIALLY    Drug use: Never    Sexual activity: Defer   Other Topics Concern    Not on file   Social History Narrative    Not on file     Social Drivers of Health     Financial Resource Strain: Not on file   Food Insecurity: No Food Insecurity (5/24/2022)    Received from " [facility]    Hunger Vital Sign     Worried About Running Out of Food in the Last Year: Never true     Ran Out of Food in the Last Year: Never true   Transportation Needs: Not on file   Physical Activity: Not on file   Stress: Not on file   Social Connections: Not on file   Intimate Partner Violence: Not on file   Housing Stability: Not on file     Allergies[4]  Medications Ordered Prior to Encounter[5]  Immunization History   Administered Date(s) Administered    DTaP vaccine, pediatric  (INFANRIX) 04/18/2012    Flu vaccine, quadrivalent, high-dose, preservative free, age 65y+ (FLUZONE) 11/19/2021, 10/10/2022, 10/10/2023, 10/16/2024    Flu vaccine, trivalent, preservative free, HIGH-DOSE, age 65y+ (Fluzone) 12/13/2016, 12/13/2018, 12/04/2019    Influenza, Seasonal, Quadrivalent, Adjuvanted 10/02/2020    Influenza, seasonal, injectable 11/03/2010, 10/11/2012, 12/16/2015    Moderna COVID-19 vaccine, 12 years and older (50mcg/0.5mL)(Spikevax) 11/12/2024    Moderna SARS-CoV-2 Vaccination 02/05/2021, 03/05/2021, 11/19/2021    Pfizer COVID-19 vaccine, 12 years and older, (30mcg/0.3mL) (Comirnaty) 11/29/2023    Pfizer COVID-19 vaccine, bivalent, age 12 years and older (30 mcg/0.3 mL) 11/26/2022    Pfizer Gray Cap SARS-CoV-2 06/01/2022    Pneumococcal conjugate vaccine, 13-valent (PREVNAR 13) 12/16/2015    Pneumococcal conjugate vaccine, 20-valent (PREVNAR 20) 05/04/2023    Pneumococcal polysaccharide vaccine, 23-valent, age 2 years and older (PNEUMOVAX 23) 04/01/2007, 12/13/2016    RSV, 60 Years And Older (AREXVY) 09/13/2023    Tdap vaccine, age 7 year and older (BOOSTRIX, ADACEL) 05/31/2022    Zoster vaccine, recombinant, adult (SHINGRIX) 05/31/2022, 10/26/2022    Zoster, live 03/31/2016     Patient's medical history was reviewed and updated either before or during this encounter.     SKYE Mason-CNP         [1]   Past Medical History:  Diagnosis Date    Atherosclerosis of native coronary artery of native  heart without angina pectoris 09/06/2023    Benign prostatic hyperplasia 09/06/2023 1/24 PSA 1.68    CKD (chronic kidney disease)     Stage 3a    Constipation      colonoscopy OK last 4 years, TSH nl 1/24, Miralax.    Degenerative joint disease 09/06/2023    Gastroesophageal reflux disease 09/06/2023    Granulomatosis with polyangiitis (Multi) 09/06/2023     rheumatology [facility]     History of pancreatitis 11/07/2023     work up neg.    HL (hearing loss)     Hyperlipidemia     Hypertension     Impaired fasting glucose 09/06/2023    Myocardial infarction (Multi) 2013    PVCs (premature ventricular contractions) 09/06/2023    Vitamin D deficiency 09/06/2023   [2]   Past Surgical History:  Procedure Laterality Date    CHOLECYSTECTOMY      CORONARY STENT PLACEMENT  2013    X 2    VASECTOMY     [3]   Family History  Problem Relation Name Age of Onset    Pancreatic cancer Mother Kadie     Cancer Mother Kadie     Alzheimer's disease Father      Mental illness Father      Cancer Daughter      Heart disease Sibling     [4] No Known Allergies  [5]   Current Outpatient Medications on File Prior to Visit   Medication Sig Dispense Refill    acetaminophen (Tylenol) 325 mg tablet Take by mouth every 6 hours if needed for mild pain (1 - 3).      ascorbic acid, vitamin C, 1,000 mg ER tablet Take 1 tablet (1,000 mg) by mouth once daily.      aspirin 81 mg EC tablet 1 tablet Orally Once a day      cholecalciferol (Vitamin D-3) 5,000 Units tablet       ezetimibe (Zetia) 10 mg tablet TAKE 1 TABLET BY MOUTH ONCE  DAILY 90 tablet 3    folic acid (Folvite) 400 mcg tablet 1 tablet (0.4 mg).      GLUCOSAMINE SULFATE ORAL       hydroCHLOROthiazide (Microzide) 12.5 mg capsule Take 1 capsule (12.5 mg) by mouth once daily.      omeprazole OTC (PriLOSEC OTC) 20 mg EC tablet 1/2 tablet by mouth daily      polyethylene glycol (Glycolax, Miralax) 17 gram packet Take 17 g by mouth once daily.       rosuvastatin (Crestor) 10 mg tablet TAKE 1 TABLET BY MOUTH ONCE  DAILY 90 tablet 3    sulfamethoxazole-trimethoprim (Bactrim DS) 800-160 mg tablet Take 1 tablet by mouth 2 times a day. 6 tablet 0    tadalafil 20 mg tablet Take 1 tablet (20 mg) by mouth once daily as needed for erectile dysfunction. 100 tablet 2    ubidecarenone (CO Q-10 ORAL) Take by mouth.      VITAMIN B COMPLEX ORAL 1000mcg Orally Daily      vitamin E 180 mg (400 unit) capsule 1 capsule Orally Once a day      ZINC ORAL Zinc 500 mg - 1 tablet Orally once a day       No current facility-administered medications on file prior to visit.

## 2025-04-16 NOTE — ASSESSMENT & PLAN NOTE
Bp stable   Continue hydrochlorothiazide 12.5mg daily  Orders:    Comprehensive Metabolic Panel    CBC; Future

## 2025-04-17 ENCOUNTER — OFFICE VISIT (OUTPATIENT)
Dept: PRIMARY CARE | Facility: CLINIC | Age: 85
End: 2025-04-17
Payer: MEDICARE

## 2025-04-17 VITALS
BODY MASS INDEX: 28.56 KG/M2 | WEIGHT: 182 LBS | HEIGHT: 67 IN | HEART RATE: 67 BPM | OXYGEN SATURATION: 98 % | TEMPERATURE: 97.2 F | DIASTOLIC BLOOD PRESSURE: 74 MMHG | SYSTOLIC BLOOD PRESSURE: 128 MMHG

## 2025-04-17 DIAGNOSIS — K21.9 GASTROESOPHAGEAL REFLUX DISEASE WITHOUT ESOPHAGITIS: ICD-10-CM

## 2025-04-17 DIAGNOSIS — E55.9 VITAMIN D DEFICIENCY: ICD-10-CM

## 2025-04-17 DIAGNOSIS — N40.0 BENIGN PROSTATIC HYPERPLASIA, UNSPECIFIED WHETHER LOWER URINARY TRACT SYMPTOMS PRESENT: ICD-10-CM

## 2025-04-17 DIAGNOSIS — I10 ESSENTIAL HYPERTENSION: ICD-10-CM

## 2025-04-17 DIAGNOSIS — Z00.00 ENCOUNTER FOR MEDICARE ANNUAL WELLNESS EXAM: Primary | ICD-10-CM

## 2025-04-17 DIAGNOSIS — C61 PROSTATE CANCER (MULTI): ICD-10-CM

## 2025-04-17 DIAGNOSIS — N18.31 STAGE 3A CHRONIC KIDNEY DISEASE (MULTI): ICD-10-CM

## 2025-04-17 DIAGNOSIS — I25.10 ATHEROSCLEROSIS OF NATIVE CORONARY ARTERY OF NATIVE HEART WITHOUT ANGINA PECTORIS: ICD-10-CM

## 2025-04-17 DIAGNOSIS — G31.84 MCI (MILD COGNITIVE IMPAIRMENT): ICD-10-CM

## 2025-04-17 DIAGNOSIS — E03.9 ACQUIRED HYPOTHYROIDISM: ICD-10-CM

## 2025-04-17 DIAGNOSIS — R73.01 IMPAIRED FASTING GLUCOSE: ICD-10-CM

## 2025-04-17 PROBLEM — R10.32 LEFT LOWER QUADRANT ABDOMINAL PAIN: Status: RESOLVED | Noted: 2024-04-15 | Resolved: 2025-04-17

## 2025-04-17 PROCEDURE — 3078F DIAST BP <80 MM HG: CPT | Performed by: NURSE PRACTITIONER

## 2025-04-17 PROCEDURE — 1159F MED LIST DOCD IN RCRD: CPT | Performed by: NURSE PRACTITIONER

## 2025-04-17 PROCEDURE — 1036F TOBACCO NON-USER: CPT | Performed by: NURSE PRACTITIONER

## 2025-04-17 PROCEDURE — 99214 OFFICE O/P EST MOD 30 MIN: CPT | Performed by: NURSE PRACTITIONER

## 2025-04-17 PROCEDURE — G0439 PPPS, SUBSEQ VISIT: HCPCS | Performed by: NURSE PRACTITIONER

## 2025-04-17 PROCEDURE — 1126F AMNT PAIN NOTED NONE PRSNT: CPT | Performed by: NURSE PRACTITIONER

## 2025-04-17 PROCEDURE — 99214 OFFICE O/P EST MOD 30 MIN: CPT | Mod: 25 | Performed by: NURSE PRACTITIONER

## 2025-04-17 PROCEDURE — 99215 OFFICE O/P EST HI 40 MIN: CPT | Performed by: NURSE PRACTITIONER

## 2025-04-17 PROCEDURE — 1160F RVW MEDS BY RX/DR IN RCRD: CPT | Performed by: NURSE PRACTITIONER

## 2025-04-17 PROCEDURE — 1157F ADVNC CARE PLAN IN RCRD: CPT | Performed by: NURSE PRACTITIONER

## 2025-04-17 PROCEDURE — 3074F SYST BP LT 130 MM HG: CPT | Performed by: NURSE PRACTITIONER

## 2025-04-17 RX ORDER — DONEPEZIL HYDROCHLORIDE 5 MG/1
5 TABLET, FILM COATED ORAL NIGHTLY
COMMUNITY
Start: 2025-04-17

## 2025-04-17 RX ORDER — DONEPEZIL HYDROCHLORIDE 10 MG/1
10 TABLET, FILM COATED ORAL
COMMUNITY
Start: 2025-05-17

## 2025-04-17 ASSESSMENT — ENCOUNTER SYMPTOMS
PSYCHIATRIC NEGATIVE: 1
ENDOCRINE NEGATIVE: 1
CARDIOVASCULAR NEGATIVE: 1
RESPIRATORY NEGATIVE: 1
ALLERGIC/IMMUNOLOGIC NEGATIVE: 1
MUSCULOSKELETAL NEGATIVE: 1
GASTROINTESTINAL NEGATIVE: 1
EYES NEGATIVE: 1
CONSTITUTIONAL NEGATIVE: 1

## 2025-04-17 ASSESSMENT — PAIN SCALES - GENERAL: PAINLEVEL_OUTOF10: 0-NO PAIN

## 2025-04-17 NOTE — ASSESSMENT & PLAN NOTE
Managed by neurology at [facility]  On aricept 5mg daily will increase to 10mg if he can tolerate 5mg

## 2025-04-30 NOTE — PROGRESS NOTES
Subjective      Chief Complaint   Patient presents with    Follow-up        Seen to reassess coronary artery disease angina free with no signs of heart failure on furosemide 3 times a week.  Initially but now just on an as-needed basis.    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   HENT:      Head: Normocephalic and atraumatic.   Eyes:      Pupils: Pupils are equal, round, and reactive to light.   Cardiovascular:      Rate and Rhythm: Normal rate and regular rhythm.      Pulses: Normal pulses.      Heart sounds: Normal heart sounds.   Pulmonary:      Effort: Pulmonary effort is normal.      Breath sounds: Normal breath sounds.   Abdominal:      General: Abdomen is flat. Bowel sounds are normal.      Palpations: Abdomen is soft.   Musculoskeletal:         General: Normal range of motion.      Cervical back: Normal range of motion.   Skin:     General: Skin is warm and dry.   Neurological:      General: No focal deficit present.   Psychiatric:         Mood and Affect: Mood normal.         Judgment: Judgment normal.          Lab Review:   Not applicable    History of coronary artery stent placement  Doing well from a cardiac standpoint on statin therapy and low-dose aspirin    Pure hypercholesterolemia  Continue on statin therapy follow-up with me in 6 months or sooner for active cardiac issues lipid profiles on an annual basis followed by his primary care physician     
31.9

## 2025-06-17 DIAGNOSIS — C61 PROSTATE CANCER (MULTI): ICD-10-CM

## 2025-07-15 LAB — PSA SERPL-MCNC: 2.21 NG/ML

## 2025-07-15 NOTE — DISCHARGE INSTRUCTIONS
Follow up in 3 weeks.    Call [phone] with questions.  Call with fever.  You may eat a regular diet  You may shower.  No strenuous activity for 24 hours     15-Jul-2025 19:25

## 2025-07-25 LAB
AP SUMMARY REPORT: NORMAL
SCAN RESULT: NORMAL

## 2025-07-25 NOTE — PROGRESS NOTES
Patient is a 85 y.o. male presenting for follow up of [place] 7 prostate cancer, and erectile dysfunction.    SUBJECTIVE:  HPI   He presents for 4-month followup and to discuss results of Decipher done in March 2025 and next steps.     Prostate biopsy identified a [place] 3+4 equal 7 in the left mid lateral region, and several Mariella 6 positive biopsies on the left.  MRI was positive for a PI-RADS 5 lesion on the left which identified [place] 7, which adjacent to the capsule, however extraprostatic extension was not identified. His last PSA was 2.21 in July 2025. He has elected for active surveillance.    He has a large left spermatocele. He states that it can be bothersome.    He uses tadalafil 20 mg PRN for erectile dysfunction with good effect.     He has new diagnosis of mild dementia.    Review of Systems   Pertinent findings noted in the HPI.     OBJECTIVE:  There were no vitals taken for this visit.  Physical Exam   Constitutional: No obvious distress.  Cardiovascular: Extremities are warm and well perfused.  Respiratory: No audible wheezing/stridor; respirations do not appear labored.  Neurologic: Alert and oriented x3.  Genitourinary: No CVA tenderness, bladder not palpable.   Genital: Left spermatocele. No penile lesions.   VARSHA: prostate is firm on the left side without tenderness.      LABS:  Lab Results   Component Value Date    WBC 3.1 (L) 03/28/2025    HGB 16.8 03/28/2025    HCT 49.7 03/28/2025    MCV 97.6 03/28/2025     03/28/2025    GLUCOSE 114 (H) 03/28/2025    CALCIUM 9.9 03/28/2025     03/28/2025    K 4.0 03/28/2025    CO2 31 03/28/2025     03/28/2025    BUN 17 03/28/2025    CREATININE 1.20 03/28/2025    EGFR 60 03/28/2025    PSA 2.21 07/14/2025    URINECULTURE SEE NOTE 03/17/2025     SURGICAL PATHOLOGY:  FINAL DIAGNOSIS: 8/23/2024   A. PROSTATE, BIOPSY, RIGHT MEDIAL MID:   -- Prostatic tissue with no evidence of malignancy.     B. PROSTATE, BIOPSY, RIGHT MEDIAL BASE:   --  Prostatic tissue with no evidence of malignancy.     C. PROSTATE, BIOPSY, RIGHT MEDIAL APEX:   -- Prostatic tissue with no evidence of malignancy.     D. PROSTATE, BIOPSY, RIGHT LATERAL MID:   -- Prostatic tissue with no evidence of malignancy.     E. PROSTATE, BIOPSY, RIGHT LATERAL APEX:   -- Prostatic tissue with no evidence of malignancy.     F. PROSTATE, BIOPSY, RIGHT LATERAL BASE:   -- Prostatic tissue with no evidence of malignancy.     G. PROSTATE, BIOPSY, TARGETED ELADIA, AREA OF INTEREST 1- LEFT MID LATERAL:   -- Prostatic adenocarcinoma, Othello score 3+4=7, Grade Group 2, involving 8 of 8 cores and fragments and approximately 80% of the overall specimen. See note.     Note: Mariella pattern 4 comprises 10% of the total tumor volume and is composed of fused and cribriform glands.      H. PROSTATE, BIOPSY, LEFT LATERAL APEX:   -- Prostatic adenocarcinoma, Othello score 3+3=6, Grade Group 1, involving 1 of 1 fragmented core and approximately 40% of the overall specimen.      I. PROSTATE, BIOPSY, LEFT MEDIAL MID:   -- Prostatic adenocarcinoma, Mariella score 3+3=6, Grade Group 1, involving 1 of 1 core and approximately 5% of the overall specimen.      J. PROSTATE, BIOPSY, LEFT LATERAL BASE:   -- Prostatic adenocarcinoma, Othello score 3+3=6, Grade Group 1, involving 1 of 1 core and approximately 10% of the overall specimen.   -- Perineural invasion present.     K. PROSTATE, BIOPSY, LEFT LATERAL MID:   -- Prostatic adenocarcinoma, Othello score 3+4=7, Grade Group 2, involving 1 of 1 core and approximately 90% of the overall specimen. See note.     Note: Othello pattern 4 comprises 25% of the total tumor volume and is composed of cribriform glands.      L. PROSTATE, BIOPSY, LEFT MEDIAL APEX:   -- Minute focus of prostatic adenocarcinoma, Mariella score 3+3=6, Grade Group 1, involving 1 of 1 fragmented core and approximately 5% of the overall specimen.      M. PROSTATE, BIOPSY, LEFT MEDIAL BASE:      -- -- Prostatic  and seminal vesicle/ejaculatory duct tissue with no evidence of malignancy. See note.     Note: PIN4 immunostain cocktail (including p63, [id number] and AMACR) shows preserved basal cells, confirming the above diagnosis.       : Dr. Leslie Maharaj.      Electronically signed by Ana Dhillon MD on 9/11/2024 at 1404     IMAGING:  MR PROSTATE WITH ELADIA BOUNDARIES IF PIRADS 3 OR ABOVE: 06/06/2024  IMPRESSION:  1.  A PI-RADS 5 lesion in the left posterolateral midglandperipheral  zone. There is broad abutment of the adjacent prostatic capsule,  however with no significant bulging or irregularity to suggest gross  extracapsular extension.  2. No evidence of enlarged pelvic lymph nodes.    Scrotal US: 4/18/2022  IMPRESSION:  1. Large anechoic extratesticular structure adjacent to the left testicle may  represent large spermatocele.  2. Right-sided hydrocele with debris. .    PROCEDURES:  PVR: 18 mL  (7/28/2025)    ASSESSMENT/PLAN:  Problem List Items Addressed This Visit       Prostate cancer (Multi) - Primary    Relevant Orders    PSA     Other Visit Diagnoses         Urinary symptom or sign        Relevant Orders    Measure post void residual (Completed)    POCT UA Automated manually resulted (Completed)          He has a [place] 3+4 equal 7 in the left mid lateral region, and several [place] 6 positive biopsies on the left.  His prostate was nodular on exam.  His PSA was 1.68 in January 2024.  MRI was positive for a PI-RADS 5 lesion on the left which identified [place] 7, which adjacent to the capsule, however extraprostatic extension was not identified. Decipher score: 0.85 (high risk) March 2025.     We discussed treatment options including ADT or radiation in detail. He has declined a repeat prostate biopsy today and has elected for active surveillance at this time. He will followup in 3 months with PSA prior.    PSA  summary  07/14/2025 2.21  03/03/2025 1.57  12/02/2024 1.55  01/16/2024 1.68  04/26/2023 1.1  06/27/2022 1.2  07/13/2021 1.5    He has history of a large left spermatocele. We discussed potential treatment with spermatocelectomy. Risks of infection and bleeding were reviewed. He has elected to hold off at this time and will call the office if he elects to be scheduled.    He has erectile dysfunction, treated successfully with tadalafil 20 mg.      All questions were answered to the patient’s satisfaction.  Patient agrees with the plan and wishes to proceed.  Follow-up will be scheduled appropriately.     Frida GANN, am scribing for, and in the presence of Anthony Lang MD.     Anthony GANN MD, personally performed the services described in the documentation as scribed by Frida Monahan, in my presence, and confirm it is both accurate and complete.

## 2025-07-28 ENCOUNTER — APPOINTMENT (OUTPATIENT)
Dept: UROLOGY | Facility: CLINIC | Age: 85
End: 2025-07-28
Payer: MEDICARE

## 2025-07-28 DIAGNOSIS — R39.9 URINARY SYMPTOM OR SIGN: ICD-10-CM

## 2025-07-28 DIAGNOSIS — C61 PROSTATE CANCER (MULTI): Primary | ICD-10-CM

## 2025-07-28 DIAGNOSIS — N43.40 SPERMATOCELE: ICD-10-CM

## 2025-07-28 LAB
POC APPEARANCE, URINE: CLEAR
POC BILIRUBIN, URINE: NEGATIVE
POC BLOOD, URINE: NEGATIVE
POC COLOR, URINE: YELLOW
POC GLUCOSE, URINE: NEGATIVE MG/DL
POC KETONES, URINE: NEGATIVE MG/DL
POC LEUKOCYTES, URINE: NEGATIVE
POC NITRITE,URINE: NEGATIVE
POC PH, URINE: 7 PH
POC PROTEIN, URINE: NEGATIVE MG/DL
POC SPECIFIC GRAVITY, URINE: 1.01
POC UROBILINOGEN, URINE: 1 EU/DL

## 2025-07-28 PROCEDURE — 1126F AMNT PAIN NOTED NONE PRSNT: CPT | Performed by: UROLOGY

## 2025-07-28 PROCEDURE — 81003 URINALYSIS AUTO W/O SCOPE: CPT | Performed by: UROLOGY

## 2025-07-28 PROCEDURE — 1036F TOBACCO NON-USER: CPT | Performed by: UROLOGY

## 2025-07-28 PROCEDURE — 1160F RVW MEDS BY RX/DR IN RCRD: CPT | Performed by: UROLOGY

## 2025-07-28 PROCEDURE — 99214 OFFICE O/P EST MOD 30 MIN: CPT | Performed by: UROLOGY

## 2025-07-28 PROCEDURE — G2211 COMPLEX E/M VISIT ADD ON: HCPCS | Performed by: UROLOGY

## 2025-07-28 PROCEDURE — 1159F MED LIST DOCD IN RCRD: CPT | Performed by: UROLOGY

## 2025-07-28 ASSESSMENT — PAIN SCALES - GENERAL: PAINLEVEL_OUTOF10: 0-NO PAIN

## 2025-08-19 ENCOUNTER — APPOINTMENT (OUTPATIENT)
Dept: UROLOGY | Facility: CLINIC | Age: 85
End: 2025-08-19
Payer: MEDICARE

## 2025-10-16 ENCOUNTER — APPOINTMENT (OUTPATIENT)
Dept: PRIMARY CARE | Facility: CLINIC | Age: 85
End: 2025-10-16
Payer: MEDICARE

## 2025-10-16 ENCOUNTER — APPOINTMENT (OUTPATIENT)
Dept: UROLOGY | Facility: CLINIC | Age: 85
End: 2025-10-16
Payer: MEDICARE

## 2025-10-27 ENCOUNTER — APPOINTMENT (OUTPATIENT)
Dept: UROLOGY | Facility: CLINIC | Age: 85
End: 2025-10-27
Payer: MEDICARE

## 2025-12-16 ENCOUNTER — APPOINTMENT (OUTPATIENT)
Age: 85
End: 2025-12-16
Payer: MEDICARE

## (undated) DEVICE — SOLUTION, IRRIGATION, X RX SODIUM CHL 0.9%, 1000ML BTL

## (undated) DEVICE — DRESSING, TELFA, 3X4

## (undated) DEVICE — NEEDLE, BIOPSY, MAX CORE, 18G

## (undated) DEVICE — GOWN, SURGICAL, SIRUS, NON REINFORCED, LARGE

## (undated) DEVICE — GLOVE, PROTEXIS PI CLASSIC, SZ-7.5, PF, LF

## (undated) DEVICE — Device